# Patient Record
Sex: FEMALE | Race: WHITE | NOT HISPANIC OR LATINO | Employment: OTHER | URBAN - METROPOLITAN AREA
[De-identification: names, ages, dates, MRNs, and addresses within clinical notes are randomized per-mention and may not be internally consistent; named-entity substitution may affect disease eponyms.]

---

## 2017-01-16 ENCOUNTER — GENERIC CONVERSION - ENCOUNTER (OUTPATIENT)
Dept: OTHER | Facility: OTHER | Age: 77
End: 2017-01-16

## 2017-01-20 ENCOUNTER — GENERIC CONVERSION - ENCOUNTER (OUTPATIENT)
Dept: OTHER | Facility: OTHER | Age: 77
End: 2017-01-20

## 2017-01-27 ENCOUNTER — GENERIC CONVERSION - ENCOUNTER (OUTPATIENT)
Dept: OTHER | Facility: OTHER | Age: 77
End: 2017-01-27

## 2017-02-14 ENCOUNTER — GENERIC CONVERSION - ENCOUNTER (OUTPATIENT)
Dept: OTHER | Facility: OTHER | Age: 77
End: 2017-02-14

## 2017-02-24 ENCOUNTER — GENERIC CONVERSION - ENCOUNTER (OUTPATIENT)
Dept: OTHER | Facility: OTHER | Age: 77
End: 2017-02-24

## 2017-03-02 ENCOUNTER — ALLSCRIPTS OFFICE VISIT (OUTPATIENT)
Dept: OTHER | Facility: OTHER | Age: 77
End: 2017-03-02

## 2017-03-10 ENCOUNTER — GENERIC CONVERSION - ENCOUNTER (OUTPATIENT)
Dept: OTHER | Facility: OTHER | Age: 77
End: 2017-03-10

## 2017-06-08 ENCOUNTER — GENERIC CONVERSION - ENCOUNTER (OUTPATIENT)
Dept: OTHER | Facility: OTHER | Age: 77
End: 2017-06-08

## 2017-08-30 ENCOUNTER — GENERIC CONVERSION - ENCOUNTER (OUTPATIENT)
Dept: OTHER | Facility: OTHER | Age: 77
End: 2017-08-30

## 2017-09-07 ENCOUNTER — ALLSCRIPTS OFFICE VISIT (OUTPATIENT)
Dept: OTHER | Facility: OTHER | Age: 77
End: 2017-09-07

## 2017-09-07 DIAGNOSIS — Z13.89 ENCOUNTER FOR SCREENING FOR OTHER DISORDER: ICD-10-CM

## 2017-09-07 DIAGNOSIS — I10 ESSENTIAL (PRIMARY) HYPERTENSION: ICD-10-CM

## 2017-09-07 DIAGNOSIS — E78.5 HYPERLIPIDEMIA: ICD-10-CM

## 2017-10-17 ENCOUNTER — GENERIC CONVERSION - ENCOUNTER (OUTPATIENT)
Dept: OTHER | Facility: OTHER | Age: 77
End: 2017-10-17

## 2017-11-09 ENCOUNTER — GENERIC CONVERSION - ENCOUNTER (OUTPATIENT)
Dept: OTHER | Facility: OTHER | Age: 77
End: 2017-11-09

## 2017-11-21 ENCOUNTER — GENERIC CONVERSION - ENCOUNTER (OUTPATIENT)
Dept: OTHER | Facility: OTHER | Age: 77
End: 2017-11-21

## 2017-12-19 ENCOUNTER — GENERIC CONVERSION - ENCOUNTER (OUTPATIENT)
Dept: INTERNAL MEDICINE CLINIC | Facility: CLINIC | Age: 77
End: 2017-12-19

## 2018-01-14 VITALS
OXYGEN SATURATION: 97 % | BODY MASS INDEX: 29.83 KG/M2 | HEART RATE: 69 BPM | WEIGHT: 162.13 LBS | SYSTOLIC BLOOD PRESSURE: 130 MMHG | HEIGHT: 62 IN | DIASTOLIC BLOOD PRESSURE: 62 MMHG

## 2018-01-15 VITALS
HEIGHT: 62 IN | OXYGEN SATURATION: 98 % | SYSTOLIC BLOOD PRESSURE: 138 MMHG | HEART RATE: 73 BPM | BODY MASS INDEX: 28.71 KG/M2 | TEMPERATURE: 78.2 F | DIASTOLIC BLOOD PRESSURE: 60 MMHG | WEIGHT: 156 LBS

## 2018-01-15 NOTE — PROGRESS NOTES
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)   12  Medicare annual wellness visit, subsequent (V70 0) (Z00 00)   13  Need for pneumococcal vaccination (V03 82) (Z23)   14  Osteopenia (733 90) (M85 80)   15  Vitamin D deficiency (268 9) (E55 9)    Current Meds   1  Aspirin 81 MG TABS; TAKE 1 TABLET DAILY; Therapy: 34Bai8359 to Recorded   2  Biotin TABS; Therapy: (Recorded:02Oct2015) to Recorded   3  Centrum Silver Oral Tablet; TAKE 1 TABLET DAILY; Therapy: (450 45 295) to Recorded   4  Citracal Calcium+D TB24; Take 1 tablet twice daily; Therapy: (450 45 295) to Recorded   5  Metoprolol Tartrate 25 MG Oral Tablet; TAKE 1 TABLET TWICE DAILY; Therapy: 27TIA4460 to (Evaluate:51Gkg6329)  Requested for: 69ZRF4418; Last   Rx:07Mar2016 Ordered   6  Pepcid 20 MG Oral Tablet; oen pill once a day; Therapy: 01Sep2016 to (Last Rx:60Lws5795)  Requested for: 01Sep2016 Ordered   7  PredniSONE 20 MG Oral Tablet; one pill q day x5 day and then 1/2 po q day x 3 day   and then a 1/4 of the pillx 3 days; Therapy: 01Sep2016 to (Last Rx:57Kmr7925)  Requested for: 01Sep2016 Ordered   8  PreserVision AREDS CAPS; Take 1 tablet twice daily; Therapy: (450 45 295) to Recorded   9  Simvastatin 20 MG Oral Tablet; TAKE 1 TABLET EVERY DAY; Therapy: 29BNO3462 to (Mike Barcenas)  Requested for: 79ONF6877; Last   Rx:30Nov2015 Ordered   10   Valsartan-Hydrochlorothiazide 160-25 MG Oral Tablet; TAKE 1 TABLET DAILY; Therapy: 92CFB4258 to (Evaluate:32Jru7856)  Requested for: 37Coo9019; Last    Rx:35Xhc7074 Ordered   11  Vitamin D 1000 UNIT CAPS; TAKE AS DIRECTED; Therapy: (Recorded:02Oct2015) to Recorded    Allergies    1  Lisinopril TABS   2  Novocain SOLN    3  Latex    Plan  Flu vaccine need    · Fluzone Quadrivalent 0 5 ML Intramuscular Suspension    Future Appointments    Date/Time Provider Specialty Site   10/10/2017 08:20 AM ARNULFO Williamson  Hematology Oncology [facility]   02/06/2017 09:20 AM ARNULFO Snell   Internal Medicine [facility]     Signatures   Electronically signed by : ARNULFO Cuba ; Oct 17 2016  6:14PM EST                       (Author)

## 2018-01-16 NOTE — MISCELLANEOUS
History of Present Illness  TCM Communication [facility]: The patient is being contacted for follow-up after hospitalization and short term disability  She was hospitalized at  The date of admission: 2/14/17, date of discharge: 2/28/17  Diagnosis: cabg  She was discharged to home  She scheduled a follow up appointment  Communication performed and completed by      Active Problems    1  Acute diarrhea (787 91) (R19 7)   2  Allergy (995 3) (T78 40XA)   3  Aortic stenosis (424 1) (I35 0)   4  Benign essential hypertension (401 1) (I10)   5  Contact dermatitis due to poison ivy (692 6) (L23 7)   6  Encounter for special screening examination for genitourinary disorder (V81 6) (Z13 89)   7  Flu vaccine need (V04 81) (Z23)   8  Hearing problem of both ears (V41 2) (H91 93)   9  Hyperglycemia (790 29) (R73 9)   10  Hyperlipidemia (272 4) (E78 5)   11  Hypertension (401 9) (I10)   12  Intraductal carcinoma in situ, 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)   13  Medicare annual wellness visit, subsequent (V70 0) (Z00 00)   14  Need for pneumococcal vaccination (V03 82) (Z23)   15  Osteopenia (733 90) (M85 80)   16  Vitamin D deficiency (268 9) (E55 9)    Surgical History    1  History of Breast Surgery Lumpectomy   2  History of Tubal Ligation    Family History  Mother    1  Family history of Hypertension  Father    2  Family history of Lung cancer    Social History    · Denied: History of Alcohol Use (History)   · Denied: History of Drug Use   · Never a smoker   · Unknown If Ever Smoked    Current Meds   1  Aspirin 81 MG TABS; TAKE 1 TABLET DAILY; Therapy: 51Ojc1865 to Recorded   2  Biotin TABS; Therapy: (Recorded:64Ydh7851) to Recorded   3  Centrum Silver Oral Tablet; TAKE 1 TABLET DAILY; Therapy: (0664 313 06 34) to Recorded   4  Citracal Calcium+D TB24; Take 1 tablet twice daily; Therapy: (0664 313 06 34) to Recorded   5  Metoprolol Tartrate 25 MG Oral Tablet; TAKE 1 TABLET TWICE DAILY;    Therapy: 65CZD1468 to (Evaluate:57Qcn9548)  Requested for: 92TBI6788; Last   Rx:07Mar2016 Ordered   6  MetroNIDAZOLE 500 MG Oral Tablet; TAKE 1 TABLET TWICE DAILY UNTIL FINISHED; Therapy: 31JPS5567 to (Evaluate:13Jan2017)  Requested for: 50KSJ5486; Last   Rx:06Jan2017 Ordered   7  Pepcid 20 MG Oral Tablet; oen pill once a day; Therapy: 01Sep2016 to (Last Rx:01Sep2016)  Requested for: 21Jzl7222 Ordered   8  PreserVision AREDS CAPS; Take 1 tablet twice daily; Therapy: [phone]) to Recorded   9  Simvastatin 20 MG Oral Tablet; Take 1 tablet daily; Therapy: 84IWG5664 to (Last Rx:17Jan2017)  Requested for: 20QOV5797 Ordered   10  Valsartan-Hydrochlorothiazide 160-25 MG Oral Tablet; Take 1 tablet daily; Therapy: 41OGI0246 to (Last Rx:17Jan2017)  Requested for: 02MRL4343 Ordered   11  Vitamin D 1000 UNIT CAPS; TAKE AS DIRECTED; Therapy: (Recorded:02Oct2015) to Recorded    Allergies    1  Lisinopril TABS   2  Novocain SOLN    3  Latex    Future Appointments    Date/Time Provider Specialty Site   10/17/2017 08:20 AM ARNULFO Leyva  Hematology Oncology [facility]   03/02/2017 10:40 AM ARNULFO Del [place]   Internal Medicine [facility]     Signatures   Electronically signed by : Barnie Simmonds, M D ; Feb 24 2017  3:45PM EST                       (Author)

## 2018-01-17 ENCOUNTER — ALLSCRIPTS OFFICE VISIT (OUTPATIENT)
Dept: OTHER | Facility: OTHER | Age: 78
End: 2018-01-17

## 2018-01-18 NOTE — PROGRESS NOTES
Assessment   1  Uncontrolled hypertension (401 9) (I10)    Plan   Health Maintenance    · There are many exercise options for seniors ; Status:Complete - Retrospective    Authorization;   Done: 70ZDP9056    Discussion/Summary      Patient recently had a flare up of rosacea possibly when she used a new Neutrogena product  This led her to seek out Urgent Care where she was given prednisone and has been feeling more flushed diaphoretic and has noticed her blood pressure to be high as mentioned above  On asking it appears that the blood pressure has been creeping up to 150 even prior to the above events  Patient denies any cardiac or cerebrovascular symptoms as mentioned above  At this point I would increase her metoprolol to 25 mg 3 times a day from twice a day and I will revisit her on Monday  I would also decrease her prednisone to half a pill next couple of days on the prednisone pack to finish her course by Friday  The patient was counseled regarding instructions for management,-- risk factor reductions,-- patient and family education,-- impressions,-- importance of compliance with treatment  total time of encounter was 25 minutes minutes-- and-- More than 50% minutes was spent counseling  History of Present Illness   Hypertension (Follow-Up): The patient presents for follow-up of Elevated today  Symptoms: denies impaired vision,-- denies dyspnea,-- denies chest pain-- and-- denies lower extremity edema  Associated symptoms include no focal neurologic deficits  Home monitoring: The patient checks her blood pressure sporadically  Blood pressure control has been poor  Medications: the patient is adherent with her medication regimen  -- She denies medication side effects  Review of Systems        Cardiovascular: as noted in HPI  Respiratory: as noted in HPI  Active Problems   1  Benign essential hypertension (401 1) (I10)   2   Encounter for breast cancer screening other than mammogram (V76 10) (Z12 39)   3  Encounter for special screening examination for genitourinary disorder (V81 6) (Z13 89)   4  Flu vaccine need (V04 81) (Z23)   5  Hearing problem of both ears (V41 2) (H91 93)   6  Hyperglycemia (790 29) (R73 9)   7  Hyperlipidemia (272 4) (E78 5)   8  Medicare annual wellness visit, subsequent (V70 0) (Z00 00)   9  Need for pneumococcal vaccination (V03 82) (Z23)   10  Noninfiltrating intraductal carcinoma of left 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 0) (D05 12)   11  Osteopenia (733 90) (M85 80)   12  Vitamin D deficiency (268 9) (E55 9)    Past Medical History   Active Problems And Past Medical History Reviewed: The active problems and past medical history were reviewed and updated today  Social History    · Denied: History of Alcohol Use (History)   · Denied: History of Drug Use   · Never a smoker   · Unknown If Ever Smoked  The social history was reviewed and updated today  Family History   Family History Reviewed: The family history was reviewed and updated today  Current Meds    1  Aspirin 81 MG TABS; TAKE 1 TABLET DAILY; Therapy: 80Niv2748 to Recorded   2  Biotin TABS; Therapy: (Recorded:02Oct2015) to Recorded   3  Centrum Silver Oral Tablet; TAKE 1 TABLET DAILY; Therapy: (([phone]) to Recorded   4  Citracal Calcium+D TB24; Take 1 tablet twice daily; Therapy: (([phone]) to Recorded   5  Metoprolol Tartrate 25 MG Oral Tablet; take 1 tablet twice a day; Therapy: 08NIG9993 to (Last SE:40BHV0640)  Requested for: 48YGN6193 Ordered   6  Pepcid 20 MG Oral Tablet; oen pill once a day; Therapy: 20Yyx6173 to (Last Rx:59Drb4324)  Requested for: 85Lul1196 Ordered   7  PreserVision AREDS CAPS; Take 1 tablet twice daily; Therapy: (([phone]) to Recorded   8  Simvastatin 20 MG Oral Tablet; Take 1 tablet daily; Therapy: 56JTB5952 to (Last Rx:96Cfk8822)  Requested for: 10Rlz1501 Ordered   9   Vitamin D3 1000 UNIT Oral Capsule; TAKE 3 CAPSULE; Therapy: (Recorded:68Wdt7940) to Recorded     The medication list was reviewed and updated today  Allergies   1  Lisinopril TABS   2  Novocain SOLN  3  Latex    Vitals    Recorded: 65AJR8661 02:09PM   Heart Rate 69   Systolic 294   Diastolic 52   Height 5 ft 2 in   Weight 163 lb 6 oz   BMI Calculated 29 88   BSA Calculated 1 75   O2 Saturation 98     Physical Exam        Constitutional      General appearance: No acute distress, well appearing and well nourished  Eyes      Conjunctiva and lids: No swelling, erythema or discharge  Ears, Nose, Mouth, and Throat      Oropharynx: Normal with no erythema, edema, exudate or lesions  Pulmonary      Respiratory effort: No increased work of breathing or signs of respiratory distress  Auscultation of lungs: Clear to auscultation  Cardiovascular      Auscultation of heart: Abnormal  -- S1S2 soft mechanical       Examination of extremities for edema and/or varicosities: Normal        Abdomen      Abdomen: Non-tender, no masses  Musculoskeletal      Gait and station: Normal        Skin      Skin and subcutaneous tissue: Abnormal  -- Slight flare-up of rosacea  Future Appointments      Date/Time Provider Specialty Site   01/22/2018 02:40 PM ARNULFO Gamble  Internal Medicine [facility]   03/09/2018 09:00 AM ARNULFO Gamble   Internal Medicine [facility]     Signatures    Electronically signed by : Fernande Rinne, M D ; Jan 17 2018  3:09PM EST                       (Author)

## 2018-01-22 ENCOUNTER — ALLSCRIPTS OFFICE VISIT (OUTPATIENT)
Dept: OTHER | Facility: OTHER | Age: 78
End: 2018-01-22

## 2018-01-22 VITALS
OXYGEN SATURATION: 97 % | WEIGHT: 165 LBS | SYSTOLIC BLOOD PRESSURE: 140 MMHG | HEART RATE: 61 BPM | BODY MASS INDEX: 30.36 KG/M2 | DIASTOLIC BLOOD PRESSURE: 70 MMHG | TEMPERATURE: 96.7 F | RESPIRATION RATE: 16 BRPM | HEIGHT: 62 IN

## 2018-01-22 VITALS
OXYGEN SATURATION: 98 % | DIASTOLIC BLOOD PRESSURE: 52 MMHG | BODY MASS INDEX: 30.07 KG/M2 | HEIGHT: 62 IN | SYSTOLIC BLOOD PRESSURE: 160 MMHG | HEART RATE: 69 BPM | WEIGHT: 163.38 LBS

## 2018-01-30 NOTE — PROGRESS NOTES
Assessment   1  Medicare annual wellness visit, subsequent (V70 0) (Z00 00)    Plan  Benign essential hypertension    · Renew: Metoprolol Tartrate 25 MG Oral Tablet; take 1 tablet three times  a day    Discussion/Summary  Impression: Subsequent Annual Wellness Visit  Cardiovascular screening and counseling: due for a lipid panel  Diabetes screening and counseling: due for blood glucose  Colorectal cancer screening and counseling: screening is current and Colonoscopy about 2 years ago  Breast cancer screening and counseling: screening is current and Had mammogram this morning results pending  Cervical cancer screening and counseling: screening not indicated  Osteoporosis screening and counseling: due for DXA axial skeleton  Abdominal aortic aneurysm screening and counseling: screening not indicated  Glaucoma screening and counseling: screening is current  HIV screening and counseling: screening not indicated  Advance Directive Planning: complete and up to date  Patient Discussion: plan discussed with the patient, follow-up visit needed in 3 months  History of Present Illness  The patient is being seen for the subsequent annual wellness visit  Medicare Screening and Risk Factors1    Hospitalizations:1  she has been previously hospitalizied1   Medicare Screening Tests Risk Questions   Drug and Alcohol Use:1  The patient  has never smoked cigarettes1  1   The patient reports  never drinking alcohol1  1   She  has never used illicit drugs1  1   Diet and Physical Activity:1  Current diet includes  well balanced meals1  ,  low fat food choices1  ,  low salt food choices1  ,  2 servings of fruit per day1  ,  2 servings of vegetables per day1  ,  1 servings of meat per day1  ,  1 servings of whole grains per day1  and  1 servings of dairy products per day1  1   She  exercises daily1  1   Exercise:1  water exercises1  ,  stretching1  ,  strength training1  45 minutes per day1      Mood Disorder and Cognitive Impairment Screenin  PHQ-9 Depression Scale1 She denies feeling down, depressed, or hopeless over the past two weeks1   She denies feeling little interest or pleasure in doing things over the past two weeks1   Cognitive impairment screenin  denies difficulty learning/retaining new information1 , denies difficulty handling complex tasks1 , denies difficulty with reasoning1 , denies difficulty with spatial ability and orientation1 , denies difficulty with language1  and denies difficulty with behavior1   Functional Ability/Level of Safety:1  Hearing is1  slightly decreased1 , slightly decreased in the right ear1  and slightly decreased in the left ear1   She denies hearing difficulties1  1  She does not use a hearing aid  1   Activities of daily living details:1  does not need help using the phone1 , no transportation help needed1 , does not need help shopping1 , no meal preparation help needed1 , does not need help doing housework1 , does not need help doing laundry1 , does not need help managing medications1  and does not need help managing money1   Fall risk factors: 1  The patient fell 0 times in the past 12 months  1   Injury History:1  no urinary incontinence1   Home safety risk factors: 1  no unfamiliar surroundings1 , no loose rugs1 , no poor household lighting1 , no uneven floors1 , no household clutter1 , grab bars in the bathroom1  and handrails on the stairs1   Advance Directives:1  Advance directives: 1  living will1 , durable power of  for health care directives1  and advance directives1   Co-Managers and Medical Equipment/Suppliers: See Patient Care Team   Falls Risk:1  The patient fell 0 times in the past 12 months  1      The patient currently has no urinary incontinence symptoms1          1 Amended By: Joni Snider; 2018 3:47 PM EST    Patient Care Team    Care Team Member Role Specialty Office Number   Alina Montez MD  Surgical Oncology [phone] Marco A Calzada MD  Radiation Oncology [phone]  Obstetrics/Gynecology [phone]   [facility]  Hematology Oncology [phone]   Elisa THOMPSON  Internal Medicine [phone]     Review of Systems    Constitutional: no fever, no chills, no malaise and no fatigue  Head and Face: no facial pressure  Eyes: no blurred vision  ENT: no earache, no nasal congestion and no sore throat    The patient presents with complaints of gradual onset of mild hearing loss  Cardiovascular: no chest pain, no palpitations, no lightheadedness and no lower extremity edema  Respiratory: no shortness of breath and no wheezing  Gastrointestinal: no abdominal pain, no nausea, no vomiting and no melena  Genitourinary: No further urinary complaints since recent lithotripsy in November  She had, but no dysuria, no urinary frequency, no urinary urgency and no hematuria  Integumentary and Breasts: facial rosacea has settled, but no rashes  Neurological: no headache, no confusion and no dizziness  Psychiatric: much better since this been off of prednisone, but no anxiety and no depression  Endocrine: no muscle weakness  Hematologic and Lymphatic: no tendency for easy bruising  Active Problems   1  Benign essential hypertension (401 1) (I10)  2  Encounter for breast cancer screening other than mammogram (V76 10) (Z12 39)  3  Encounter for special screening examination for genitourinary disorder (V81 6) (Z13 89)  4  Flu vaccine need (V04 81) (Z23)  5  Hearing problem of both ears (V41 2) (H91 93)  6  Hyperglycemia (790 29) (R73 9)  7  Hyperlipidemia (272 4) (E78 5)  8  Medicare annual wellness visit, subsequent (V70 0) (Z00 00)  9  Need for pneumococcal vaccination (V03 82) (Z23)  10  Noninfiltrating intraductal carcinoma of left breast (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 0) (D05 12)  11  Osteopenia (733 90) (M85 80)  12  Vitamin D deficiency (268 9) (E55 9)    Past Medical History   1   History of Acute diarrhea (787 91) (R19 7)  2  History of Contact dermatitis due to poison ivy (692 6) (L23 7)  3  History of Dyspepsia (536 8) (R10 13)  4  History of allergy (V15 09) (Z88 9)  5  History of aortic valve stenosis (V12 59) (Z86 79)  6  History of cough    The active problems and past medical history were reviewed and updated today  Surgical History   1  History of Aortic Valve Replacement  2  History of Breast Surgery Lumpectomy  3  History of CABG  4  History of Tubal Ligation    The surgical history was reviewed and updated today  Family History  Mother   1  Family history of Hypertension  Father   2  Family history of Lung cancer    The family history was reviewed and updated today  Social History    · Denied: History of Alcohol Use (History)   · Denied: History of Drug Use   · Never a smoker   · Unknown If Ever Smoked  The social history was reviewed and updated today  Current Meds  1  Aspirin 81 MG TABS; TAKE 1 TABLET DAILY; Therapy: 34Hid2397 to Recorded  2  Biotin TABS; Therapy: (Recorded:02Oct2015) to Recorded  3  Centrum Silver Oral Tablet; TAKE 1 TABLET DAILY; Therapy: (([phone]) to Recorded  4  Citracal Calcium+D TB24; Take 1 tablet twice daily; Therapy: (([phone] 6793 4834) to Recorded  5  Metoprolol Tartrate 25 MG Oral Tablet; take 1 tablet twice a day; Therapy: 90MPE8131 to (Last MQ:75MJJ1944)  Requested for: 82OIP1548 Ordered  6  Pepcid 20 MG Oral Tablet; oen pill once a day; Therapy: 83Vbc1689 to (Last Rx:11Lyq6828)  Requested for: 95Flc3259 Ordered  7  PreserVision AREDS CAPS; Take 1 tablet twice daily; Therapy: (([phone] [phone]) to Recorded  8  Simvastatin 20 MG Oral Tablet; Take 1 tablet daily; Therapy: 45YKC6669 to (Last Rx:24Lny9272)  Requested for: 78Doz2382 Ordered  9  Vitamin D3 1000 UNIT Oral Capsule; TAKE 3 CAPSULE; Therapy: (Recorded:71Gcs1333) to Recorded    The medication list was reviewed and updated today  Allergies   1  Lisinopril TABS  2  Novocain SOLN   3  Latex    Immunizations  Influenza --- Kaur Boas: 04-Oct-2000; Series2: 12-Nov-2001; Series3: 02-Oct-2002; Series4:  15-Oct-2003; Series5: 26-Oct-2004; Series6: Oct 2007; Series7: 17-Nxb-7610Iktfzoo Buerger: 2009;  Series9: 29-Sep-2011Baker Solid: 03-Oct-2014; PKQUIO24: 01-Oct-2015; NQWHNJ85: 14-Oct-2016;  TJXHWC05: Nov 2017   PCV --- Series1: 16-Jul-2015   PPSV --- Series1: 17-Jan-2003; Series2: 28-Oct-2010   Tdap --- Kaur Boas: 06-Nov-2013   Zoster --- Series1: 2010     Vitals  Signs   Recorded: 20SGW1425 02:41PM   Heart Rate: 64  Systolic: 233  Diastolic: 70  Height: 5 ft 2 in  Weight: 165 lb   BMI Calculated: 30 18  BSA Calculated: 1 76  O2 Saturation: 98    Future Appointments    Date/Time Provider Specialty Site   03/09/2018 09:00 AM Leopoldo Locket, M D   Internal Medicine [facility] IM     Signatures   Electronically signed by : ARNULFO Bright ; Jan 22 2018  3:13PM EST                       (Author)    Electronically signed by : ARNULFO Bright ; Jan 22 2018  3:48PM EST                       (Author)

## 2018-02-06 NOTE — PROGRESS NOTES
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)    Plan   Intraductal carcinoma in situ, left    · (1) CBC/PLT/DIFF; Status:Active; Requested for:11Oct2016;    Perform:[facility]; Due:11Oct2017;Ordered; For:Intraductal carcinoma in situ, left; Ordered By:Anastasiia Andrews;   · (1) COMPREHENSIVE METABOLIC PANEL; Status:Active; Requested for:11Oct2016;    Perform:[facility]; Due:11Oct2017;Ordered; For:Intraductal carcinoma in situ, left; Ordered By:Guillermo Andrews;   · Follow-up visit in 1 year Evaluation and Treatment  Follow-up  Status: Complete  Done:    [id number] 08:20AM   Ordered; For: Intraductal carcinoma in situ, left; Ordered By: Sanjuana Gauthier Performed:  Due: 50KMJ3575; Last Updated By: Hiram Asencio; 10/11/2016 8:54:38 AM    Discussion/Summary   Discussion Summary:    Reginaldo Gann completed 5 years of tamoxifen for breast cancer risk reduction in April 2015  Bilateral diagnostic mammogram completed January 2016  She has prescription from Dr Janki Winn for 2017  She continues to follow up with her gynecologist, Dr Evans Lakhani  She reports she is up to date with colonoscopy (2014)  She is feeling well  She is is asked to follow up in 1 year  Call if any issues or concern  History of Present Illness   Previous Therapy:    1  Ductal carcinoma in situ of the left breast, nuclear grade I, 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%, NV 80%, status post left lumpectomy in March 2010 and partial breast irradiation with IMRT in April 2010  Tamoxifen initiated in April 2010  Completed 5 years of treatment  Osteopenia of the femoral neck  Was on Fosamax previously  Takes Calcium + vitamin D  Had Dexa 8/22/16  Current Therapy: Tamoxifen initiated in April 2010  Interval History: No new issues or complaints  Had follow up echo for her A S  Was told she had worsening  Will see her cardiologist, Dr Michelle Saldaña in December   Continues to see her gynecologist, Dr Siena Carrasco Lennox Bence  Review of Systems   Complete-Female:      Constitutional: No fever, no chills, feels well, no tiredness, no recent weight gain or weight loss  Eyes: No complaints of eye pain, no red eyes, no eyesight problems, no discharge, no dry eyes, no itching of eyes  ENT: no complaints of earache, no loss of hearing, no nose bleeds, no nasal discharge, no sore throat, no hoarseness  Cardiovascular: No complaints of slow heart rate, no fast heart rate, no chest pain, no palpitations, no leg claudication, no lower extremity edema  Respiratory: No complaints of shortness of breath, no wheezing, no cough, no SOB on exertion, no orthopnea, no PND,-- no shortness of breath-- and-- no cough  Gastrointestinal: No complaints of abdominal pain, no constipation, no nausea or vomiting, no diarrhea, no bloody stools,-- no abdominal pain,-- no constipation-- and-- no diarrhea  Genitourinary: No complaints of dysuria, no incontinence, no pelvic pain, no dysmenorrhea, no vaginal discharge or bleeding  Musculoskeletal: No complaints of arthralgias, no myalgias, no joint swelling or stiffness, no limb pain or swelling-- and-- no arthralgias  Integumentary: No complaints of skin rash or lesions, no itching, no skin wounds, no breast pain or lump  Neurological: No complaints of headache, no confusion, no convulsions, no numbness, no dizziness or fainting, no tingling, no limb weakness, no difficulty walking,-- no headache-- and-- no difficulty walking  Psychiatric: Not suicidal, no sleep disturbance, no anxiety or depression, no change in personality, no emotional problems-- and-- no emotional problems  Endocrine: No complaints of proptosis, no hot flashes, no muscle weakness, no deepening of the voice, no feelings of weakness  Hematologic/Lymphatic: No complaints of swollen glands, no swollen glands in the neck, does not bleed easily, does not bruise easily  Active Problems   1  Allergy (995 3) (T78 40XA)   2  Aortic stenosis (424 1) (I35 0)   3  Benign essential hypertension (401 1) (I10)   4  Contact dermatitis due to poison ivy (692 6) (L23 7)   5  Encounter for special screening examination for genitourinary disorder (V81 6) (Z13 89)   6  Flu vaccine need (V04 81) (Z23)   7  Hearing problem of both ears (V41 2) (H91 93)   8  Hyperglycemia (790 29) (R73 9)   9  Hyperlipidemia (272 4) (E78 5)   10  Hypertension (401 9) (I10)   11  Intraductal carcinoma in situ, 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)   12  Medicare annual wellness visit, subsequent (V70 0) (Z00 00)   13  Need for pneumococcal vaccination (V03 82) (Z23)   14  Osteopenia (733 90) (M85 80)   15  Vitamin D deficiency (268 9) (E55 9)    Surgical History   1  History of Breast Surgery Lumpectomy   2  History of Tubal Ligation    Family History   Mother    1  Family history of Hypertension  Father    2  Family history of Lung cancer    Social History    · Denied: History of Alcohol Use (History)   · Denied: History of Drug Use   · Never a smoker   · Unknown If Ever Smoked    Current Meds    1  Aspirin 81 MG TABS; TAKE 1 TABLET DAILY; Therapy: 10Ejg4986 to Recorded   2  Biotin TABS; Therapy: (Recorded:02Oct2015) to Recorded   3  Centrum Silver Oral Tablet; TAKE 1 TABLET DAILY; Therapy: (0664 313 06 34) to Recorded   4  Citracal Calcium+D TB24; Take 1 tablet twice daily; Therapy: (0664 313 06 34) to Recorded   5  Metoprolol Tartrate 25 MG Oral Tablet; TAKE 1 TABLET TWICE DAILY; Therapy: 52URD5855 to (Evaluate:96Fpr4442)  Requested for: 39RAG6920; Last     Rx:07Mar2016 Ordered   6  Pepcid 20 MG Oral Tablet; oen pill once a day; Therapy: 01Sep2016 to (Last Rx:01Sep2016)  Requested for: 01Sep2016 Ordered   7  PredniSONE 20 MG Oral Tablet; one pill q day x5 day and then 1/2 po q day x 3 day     and then a 1/4 of the pillx 3 days;      Therapy: 01Sep2016 to (Last Rx:05Mam4373)  Requested for: 66TBA1722 Ordered   8  PreserVision AREDS CAPS; Take 1 tablet twice daily; Therapy: ([id number]) to Recorded   9  Simvastatin 20 MG Oral Tablet; TAKE 1 TABLET EVERY DAY; Therapy: 64ABY9813 to (Divina Bland)  Requested for: 35XTN6479; Last     Rx:30Nov2015 Ordered   10  Valsartan-Hydrochlorothiazide 160-25 MG Oral Tablet; TAKE 1 TABLET DAILY; Therapy: 70AIA2566 to (Evaluate:49Fmk0912)  Requested for: 07Pfm1803; Last      Rx:21Mss6533 Ordered   11  Vitamin D 1000 UNIT CAPS; TAKE AS DIRECTED; Therapy: (Recorded:02Oct2015) to Recorded    Allergies   1  Lisinopril TABS   2  Novocain SOLN  3  Latex    Vitals   Vital Signs    Recorded: 35GGR4120 38:75BT   Systolic 318   Diastolic 70   Heart Rate 70   Respiration 16   Temperature 98 F   O2 Saturation 96   Height 5 ft 2 in   Weight 170 lb 4 00 oz   BMI Calculated 31 14   BSA Calculated 1 78     Physical Exam        Constitutional      General appearance: No acute distress, well appearing and well nourished  Eyes      Conjunctiva and lids: No swelling, erythema or discharge  Pupils and irises: Equal, round and reactive to light  Ears, Nose, Mouth, and Throat      External inspection of ears and nose: Normal  -- heartin id -- Oropharynx is clear  Pulmonary      Auscultation of lungs: Clear to auscultation  Cardiovascular      Palpation of heart: Normal PMI, no thrills  -- + murmur  Examination of extremities for edema and/or varicosities: Normal        Abdomen      Abdomen: Non-tender, no masses  Liver and spleen: No hepatomegaly or splenomegaly  Lymphatic      Palpation of lymph nodes in neck: No lymphadenopathy  Musculoskeletal      Gait and station: Normal  -- arthritic changes  Skin      Skin and subcutaneous tissue: Normal without rashes or lesions  Neurologic Neurologicals grossly intact        Psychiatric      Orientation to person, place, and time: Normal        Mood and affect: Normal        Additional Exam:  no breast tenderness or palpable mass  ECOG 0       Future Appointments      Date/Time Provider Specialty Site   02/06/2017 09:20 AM ARNULFO Segovia   Internal Medicine [facility]   10/14/2016 12:30 PM CAMILA Hewitt, Nurse Schedule  [facility]     Signatures    Electronically signed by : Eduardo Sarmiento [facility]; Oct 11 2016  9:00AM EST                       (Author)     Electronically signed by : ARNULFO Kidd ; Feb 5 2018 10:32PM EST                       (Author)

## 2018-02-13 ENCOUNTER — OFFICE VISIT (OUTPATIENT)
Dept: INTERNAL MEDICINE CLINIC | Facility: CLINIC | Age: 78
End: 2018-02-13
Payer: MEDICARE

## 2018-02-13 ENCOUNTER — TELEPHONE (OUTPATIENT)
Dept: INTERNAL MEDICINE CLINIC | Facility: CLINIC | Age: 78
End: 2018-02-13

## 2018-02-13 VITALS
SYSTOLIC BLOOD PRESSURE: 140 MMHG | BODY MASS INDEX: 30.55 KG/M2 | OXYGEN SATURATION: 97 % | DIASTOLIC BLOOD PRESSURE: 62 MMHG | HEART RATE: 68 BPM | HEIGHT: 62 IN | WEIGHT: 166 LBS

## 2018-02-13 DIAGNOSIS — Z95.2 AORTIC VALVE REPLACED: ICD-10-CM

## 2018-02-13 DIAGNOSIS — I10 HYPERTENSION, UNSPECIFIED TYPE: Primary | ICD-10-CM

## 2018-02-13 PROCEDURE — 99214 OFFICE O/P EST MOD 30 MIN: CPT | Performed by: INTERNAL MEDICINE

## 2018-02-13 RX ORDER — SIMVASTATIN 20 MG
TABLET ORAL
COMMUNITY
Start: 2017-12-29 | End: 2018-10-01 | Stop reason: SDUPTHER

## 2018-02-13 RX ORDER — FAMOTIDINE 20 MG/1
20 TABLET, FILM COATED ORAL
COMMUNITY
Start: 2017-10-02 | End: 2018-03-23 | Stop reason: SDUPTHER

## 2018-02-13 RX ORDER — FAMOTIDINE 20 MG/1
TABLET, FILM COATED ORAL
COMMUNITY
Start: 2016-09-01 | End: 2018-03-09 | Stop reason: SDUPTHER

## 2018-02-13 RX ORDER — VIT A/VIT C/VIT E/ZINC/COPPER 4296-226
1 CAPSULE ORAL 2 TIMES DAILY
COMMUNITY

## 2018-02-13 RX ORDER — HYDROCHLOROTHIAZIDE 25 MG/1
25 TABLET ORAL DAILY
Qty: 30 TABLET | Refills: 1 | Status: SHIPPED | OUTPATIENT
Start: 2018-02-13 | End: 2018-03-09 | Stop reason: ALTCHOICE

## 2018-02-13 RX ORDER — BIOTIN 10 MG
TABLET ORAL
COMMUNITY

## 2018-02-13 RX ORDER — TRIAMCINOLONE ACETONIDE 1 MG/G
CREAM TOPICAL
COMMUNITY
Start: 2018-01-10 | End: 2019-02-20 | Stop reason: ALTCHOICE

## 2018-02-13 RX ORDER — METRONIDAZOLE 10 MG/G
GEL TOPICAL
COMMUNITY
Start: 2016-06-09

## 2018-02-13 RX ORDER — MULTIVIT-MIN/IRON/FOLIC ACID/K 18-600-40
3000 CAPSULE ORAL
COMMUNITY

## 2018-02-13 RX ORDER — AMOXICILLIN 500 MG/1
TABLET, FILM COATED ORAL
COMMUNITY
Start: 2017-07-19

## 2018-02-13 RX ORDER — ASPIRIN 81 MG/1
81 TABLET, CHEWABLE ORAL
COMMUNITY
Start: 2011-08-24

## 2018-02-13 NOTE — TELEPHONE ENCOUNTER
Pt called stating that she takes metoprolol now 3 times a day and this change was made a few weeks ago however she isnt noticing much of a difference in her blood pressure and wants to know what she should do?  Please advise

## 2018-02-13 NOTE — PROGRESS NOTES
Chief Complaint   Patient presents with    Blood Pressure Check     Assessment/Plan:    No problem-specific Assessment & Plan notes found for this encounter  Diagnoses and all orders for this visit:    Hypertension, unspecified type  -     hydrochlorothiazide (HYDRODIURIL) 25 mg tablet; Take 1 tablet (25 mg total) by mouth daily    I will start  Her back on HCTZ  II asked her to come back in a week to get blood pressure check with nurse  If the blood pressure is normal I will see her back early next month  Aortic valve replaced    Other orders  -     amoxicillin (AMOXIL) 500 MG tablet; Take 4 tablets 30-60 minutes prior to dental procedures  -     aspirin 81 mg chewable tablet; Chew 81 mg  -     Cholecalciferol (VITAMIN D) 2000 units CAPS; Take 3,000 Units by mouth  -     Biotin 10 MG TABS; Take by mouth  -     Multiple Vitamins-Minerals (CENTRUM ADULTS PO); Take 1 tablet by mouth daily  -     famotidine (PEPCID) 20 mg tablet; Take 20 mg by mouth  -     metoprolol tartrate (LOPRESSOR) 25 mg tablet; Take 25 mg by mouth 2 pills in AM 1 pill PM    -     metroNIDAZOLE (METROGEL) 1 % gel;   -     famotidine (PEPCID) 20 mg tablet; Take by mouth  -     Multiple Vitamins-Minerals (PRESERVISION AREDS) CAPS; Take 1 tablet by mouth 2 (two) times a day  -     simvastatin (ZOCOR) 20 mg tablet;   -     triamcinolone (KENALOG) 0 1 % cream;         Subjective:      Patient ID: Felipe Halsted is a 76 y o  female  Patient is here to follow-up on hypertension  She is status post aortic valve replacement  After her surgery only medication that she needed had been metoprolol 25 mg twice a day  Recently her blood pressure had been on the higher side and increase her metoprolol to 50 mg in the morning keeping the evening dose at 25 mg  She has been monitoring her blood pressure at home  It has been quite labile symptoms going up to 160  Occasion she will feel her hands become somewhat puffy    I checked her blood pressure twice in the office today and was 138/90  The patient that she brought with her did not tell E well with our machine  The following portions of the patient's history were reviewed and updated as appropriate: allergies, current medications, past family history, past medical history, past social history, past surgical history and problem list     Review of Systems   Respiratory: Negative for cough and shortness of breath  Cardiovascular: Negative for chest pain, palpitations and leg swelling  Musculoskeletal:        Hands feel puffy sometime   Neurological: Positive for headaches  Objective:    Vitals:    02/13/18 1458   BP: 140/62   Pulse: 68   SpO2: 97%        Physical Exam   Constitutional: She is oriented to person, place, and time  She appears well-nourished  No distress  Eyes: No scleral icterus  Cardiovascular: Normal rate, regular rhythm and normal heart sounds  Pulmonary/Chest: Effort normal and breath sounds normal  No respiratory distress  She has no wheezes  She has no rales  Abdominal: Bowel sounds are normal  She exhibits no distension  There is no tenderness  Musculoskeletal: She exhibits no edema  Neurological: She is oriented to person, place, and time

## 2018-03-09 ENCOUNTER — OFFICE VISIT (OUTPATIENT)
Dept: INTERNAL MEDICINE CLINIC | Facility: CLINIC | Age: 78
End: 2018-03-09
Payer: MEDICARE

## 2018-03-09 VITALS
HEIGHT: 62 IN | OXYGEN SATURATION: 96 % | WEIGHT: 164.6 LBS | SYSTOLIC BLOOD PRESSURE: 150 MMHG | BODY MASS INDEX: 30.29 KG/M2 | DIASTOLIC BLOOD PRESSURE: 60 MMHG | HEART RATE: 64 BPM

## 2018-03-09 DIAGNOSIS — I10 ESSENTIAL HYPERTENSION: Primary | ICD-10-CM

## 2018-03-09 DIAGNOSIS — Z00.00 MEDICARE ANNUAL WELLNESS VISIT, SUBSEQUENT: ICD-10-CM

## 2018-03-09 PROCEDURE — G0438 PPPS, INITIAL VISIT: HCPCS | Performed by: INTERNAL MEDICINE

## 2018-03-09 RX ORDER — LOSARTAN POTASSIUM 50 MG/1
50 TABLET ORAL DAILY
Qty: 30 TABLET | Refills: 1 | Status: SHIPPED | OUTPATIENT
Start: 2018-03-09 | End: 2018-03-23 | Stop reason: SDUPTHER

## 2018-03-09 NOTE — PROGRESS NOTES
Assessment/Plan:    No problem-specific Assessment & Plan notes found for this encounter  Diagnoses and all orders for this visit:    Essential hypertension  -     losartan (COZAAR) 50 mg tablet; Take 1 tablet (50 mg total) by mouth daily      I would start her on on losartan 50 mg a day  She will get her blood pressure check next couple of days at  [address]  I will be seeing her back in 2 weeks for re-evaluation of her blood pressure  Medicare annual wellness visit, subsequent     Screening reviewed as above  Labs  vaccination and mammogram up-to-date  Subjective:      Patient ID: Jose Jeffers is a 76 y o  female    AWV Clinical     ISAR:   Previous hospitalizations?:  Yes   How many hospitalizations have you had in the last year?:  1-2       Once in a Lifetime Medicare Screening:       Medicare Screening Tests and Risk Assessment:   AAA Risk Assessment    Osteoporosis Risk Assessment    HIV Risk Assessment        Drug and Alcohol Use:   Tobacco use    Cigarettes:  never smoker    Tobacco use duration    Tobacco Cessation Readiness    Alcohol use    Alcohol use:  never    Alcohol Treatment Readiness   Illicit Drug Use    Drug use:  never        Diet & Exercise:   Diet   What is your diet?:  No Added Salt, Regular   How many servings a day of the following:    Meat:  1-2   Whole Grains:  1    Dairy:  1 Soda:  0   Coffee:  0 Tea:  0   Exercise    Do you currently exercise?:  yes    Frequency:  daily    Minutes per day:  45    Minutes per week:  90    Type of exercise:  walking, weights   water        Cognitive Impairment Screening:   Depression screening preformed:  No    Cognitive Impairment Screening    Do you have difficulty learning or retaining new information?:  No Do you have difficulty handling new tasks?:  No   Do you have difficulty with reasoning?:  No Do you have difficulty with spatial ability and orientation?:  No   Do you have difficulty with language?:  No Do you have difficulty with behavior?:  No       Functional Ability/Level of Safety:   Hearing     Bilateral:  slightly decreased   Left:  slightly decreased Right:  slightly decreased   Hearing aid:  Yes    Hearing Impairment Assessment    Hearing status:  Hard of hearing   Current Activities    Help needed with the folllowing:    Using the phone:  No Transportation:  No   Shopping:  No Preparing Meals:  No   Doing Housework:  No Doing Laundry:  No   Managing Medications:  No Managing Money:  No   ADL    Fall Risk   Have you fallen in the last 12 months?:  No    How many times?:  0    Injury History       Home Safety:   Are there hazards in your environment?:  No   If you fell, would you need help to get back up from the ground?:  No Do you have problems or concerns getting in/out of a bed, chair, tub, or toilet?:  No   Do you feel unsteady when walking?:  No Is your activity limited by pain?:  No   Do you have handrails and grab-bars in the home?:  Yes    Are you and/or family members aware of the dangers of smoking in bed?:  No    Do you have working smoke alarms and fire extinguisher?:  Yes    Have you left the stove on unsupervised?:  No    Home Safety Risk Factors   Unfamilar with surroundings:  No Uneven floors:  No   Stairs or handrail saftey risk:  No Loose rugs:  No   Household clutter:  No Poor household lighting:  No   No grab bars in bathroom:  Yes Further evaluation needed:  No       Advanced Directives:   Advanced Directives    Living Will:  Yes Durable POA for healthcare: Yes   Advanced directive:  Yes    Patient's End of Life Decisions        Urinary Incontinence:   Do you have urinary incontinence?:  No    Do you urinate frequently?:  No    Do you have nocturia (waking up to urinate)?:  Yes        Glaucoma:           Medicare wellness  She is [facility] at [facility]   Recently had a PPD that was unremarkable          The following portions of the patient's history were reviewed and updated as appropriate: allergies, current medications, past family history, past medical history, past social history, past surgical history and problem list     Review of Systems   Constitutional: Negative for appetite change, chills, fatigue, fever and unexpected weight change  HENT: Negative for congestion, hearing loss, postnasal drip, trouble swallowing and voice change  Eyes: Negative for pain and visual disturbance  Respiratory: Negative for cough, chest tightness and shortness of breath  Cardiovascular: Negative for chest pain, palpitations and leg swelling  Gastrointestinal: Negative for abdominal pain, blood in stool, constipation, diarrhea, nausea and vomiting  Endocrine: Negative for cold intolerance, heat intolerance, polydipsia and polyphagia  Genitourinary: Negative for difficulty urinating, dysuria, flank pain, frequency and hematuria  No recurrence of kidney stones   Musculoskeletal: Negative for arthralgias, back pain, gait problem, joint swelling and myalgias  Skin: Negative for rash  Neurological: Negative for dizziness, weakness, light-headedness, numbness and headaches  Hematological: Negative for adenopathy  Does not bruise/bleed easily  Psychiatric/Behavioral: Negative for confusion, dysphoric mood and sleep disturbance  Objective:      /60   Pulse 64   Ht 5' 2 4" (1 585 m)   Wt 74 7 kg (164 lb 9 6 oz)   SpO2 96%   BMI 29 72 kg/m²          Physical Exam   Constitutional: She is oriented to person, place, and time  She appears well-developed and well-nourished  No distress  HENT:   Head: Normocephalic  Mouth/Throat: No oropharyngeal exudate  Eyes: Conjunctivae are normal  Pupils are equal, round, and reactive to light  No scleral icterus  Neck: No JVD present  No thyromegaly present  Cardiovascular: Normal rate, regular rhythm and intact distal pulses  Murmur heard    Mechanical S2   Pulmonary/Chest: Effort normal and breath sounds normal  No respiratory distress  She has no wheezes  She has no rales  Abdominal: Soft  Bowel sounds are normal  There is no tenderness  There is no rebound  Musculoskeletal: She exhibits no edema  Lymphadenopathy:     She has no cervical adenopathy  Neurological: She is alert and oriented to person, place, and time  She has normal reflexes  No cranial nerve deficit  Skin: Skin is warm  Psychiatric: She has a normal mood and affect   Her behavior is normal  Judgment and thought content normal

## 2018-03-23 ENCOUNTER — OFFICE VISIT (OUTPATIENT)
Dept: INTERNAL MEDICINE CLINIC | Facility: CLINIC | Age: 78
End: 2018-03-23
Payer: MEDICARE

## 2018-03-23 VITALS
OXYGEN SATURATION: 96 % | HEART RATE: 60 BPM | HEIGHT: 62 IN | BODY MASS INDEX: 30.36 KG/M2 | WEIGHT: 165 LBS | DIASTOLIC BLOOD PRESSURE: 78 MMHG | SYSTOLIC BLOOD PRESSURE: 138 MMHG

## 2018-03-23 DIAGNOSIS — Z95.2 S/P AVR (AORTIC VALVE REPLACEMENT): Primary | ICD-10-CM

## 2018-03-23 DIAGNOSIS — I10 ESSENTIAL HYPERTENSION: ICD-10-CM

## 2018-03-23 PROBLEM — I51.89 DIASTOLIC DYSFUNCTION: Status: ACTIVE | Noted: 2017-11-09

## 2018-03-23 PROBLEM — Z95.1 S/P CABG X 1: Status: ACTIVE | Noted: 2017-02-08

## 2018-03-23 PROBLEM — R10.13 DYSPEPSIA: Status: ACTIVE | Noted: 2017-09-07

## 2018-03-23 PROBLEM — I25.118 CORONARY ARTERY DISEASE OF NATIVE ARTERY WITH STABLE ANGINA PECTORIS (HCC): Status: ACTIVE | Noted: 2017-01-27

## 2018-03-23 PROCEDURE — 99213 OFFICE O/P EST LOW 20 MIN: CPT | Performed by: INTERNAL MEDICINE

## 2018-03-23 RX ORDER — LOSARTAN POTASSIUM 50 MG/1
50 TABLET ORAL DAILY
Qty: 90 TABLET | Refills: 3 | Status: SHIPPED | OUTPATIENT
Start: 2018-03-23 | End: 2018-07-24 | Stop reason: SDUPTHER

## 2018-03-23 RX ORDER — FAMOTIDINE 20 MG/1
20 TABLET, FILM COATED ORAL DAILY
Qty: 30 TABLET | Refills: 3 | Status: SHIPPED | OUTPATIENT
Start: 2018-03-23

## 2018-03-23 NOTE — PROGRESS NOTES
Assessment/Plan:    No problem-specific Assessment & Plan notes found for this encounter  Diagnoses and all orders for this visit:    S/P AVR (aortic valve replacement)    Essential hypertension  -     losartan (COZAAR) 50 mg tablet; Take 1 tablet (50 mg total) by mouth daily  -     famotidine (PEPCID) 20 mg tablet; Take 1 tablet (20 mg total) by mouth daily      Continue current regimen  Follow-up in 4 months    Subjective:      Patient ID: Ambika Coy is a 76 y o  female  Patient is here to follow-up on hypertension  Her blood pressure was quite high last time she was here and started on losartan  Her blood pressure is excellent today at 138/78  She reports no chest pain shortness of breath lightheadedness or palpitation  She is taking metoprolol 25 mg twice a day as well  No new problems or concerns  Her recent lab studies were reviewed and unremarkable  A copy was given to the patient          Current Outpatient Prescriptions:     amoxicillin (AMOXIL) 500 MG tablet, Take 4 tablets 30-60 minutes prior to dental procedures, Disp: , Rfl:     aspirin 81 mg chewable tablet, Chew 81 mg, Disp: , Rfl:     Biotin 10 MG TABS, Take by mouth, Disp: , Rfl:     Cholecalciferol (VITAMIN D) 2000 units CAPS, Take 3,000 Units by mouth, Disp: , Rfl:     famotidine (PEPCID) 20 mg tablet, Take 1 tablet (20 mg total) by mouth daily, Disp: 30 tablet, Rfl: 3    losartan (COZAAR) 50 mg tablet, Take 1 tablet (50 mg total) by mouth daily, Disp: 90 tablet, Rfl: 3    metoprolol tartrate (LOPRESSOR) 25 mg tablet, Take 25 mg by mouth 2 pills in AM 1 pill PM  , Disp: , Rfl:     metroNIDAZOLE (METROGEL) 1 % gel, , Disp: , Rfl:     Multiple Vitamins-Minerals (CENTRUM ADULTS PO), Take 1 tablet by mouth daily, Disp: , Rfl:     Multiple Vitamins-Minerals (PRESERVISION AREDS) CAPS, Take 1 tablet by mouth 2 (two) times a day, Disp: , Rfl:     simvastatin (ZOCOR) 20 mg tablet, , Disp: , Rfl:     triamcinolone (KENALOG) 0 1 % cream, , Disp: , Rfl:   The following portions of the patient's history were reviewed and updated as appropriate: allergies, current medications, past family history, past medical history, past social history, past surgical history and problem list     Review of Systems   Respiratory: Negative for cough and shortness of breath  Cardiovascular: Negative for chest pain and leg swelling  Gastrointestinal: Negative for abdominal pain  Neurological: Negative for dizziness, numbness and headaches  Objective:      /78 (BP Location: Left arm, Patient Position: Sitting, Cuff Size: Standard)   Pulse 60   Ht 5' 2 4" (1 585 m)   Wt 74 8 kg (165 lb)   SpO2 96%   BMI 29 79 kg/m²          Physical Exam   Constitutional: She appears well-nourished  No distress  Cardiovascular: Normal rate and regular rhythm  Murmur heard  Pulmonary/Chest: Breath sounds normal  No respiratory distress  She has no wheezes  She has no rales  Musculoskeletal: She exhibits no edema  Neurological: She is alert

## 2018-07-24 ENCOUNTER — OFFICE VISIT (OUTPATIENT)
Dept: INTERNAL MEDICINE CLINIC | Facility: CLINIC | Age: 78
End: 2018-07-24
Payer: MEDICARE

## 2018-07-24 VITALS
OXYGEN SATURATION: 95 % | HEIGHT: 62 IN | HEART RATE: 54 BPM | TEMPERATURE: 98.5 F | SYSTOLIC BLOOD PRESSURE: 160 MMHG | WEIGHT: 167.4 LBS | DIASTOLIC BLOOD PRESSURE: 70 MMHG | BODY MASS INDEX: 30.8 KG/M2

## 2018-07-24 DIAGNOSIS — Z95.2 S/P AVR (AORTIC VALVE REPLACEMENT): ICD-10-CM

## 2018-07-24 DIAGNOSIS — I10 ESSENTIAL HYPERTENSION: Primary | ICD-10-CM

## 2018-07-24 DIAGNOSIS — E78.2 MIXED HYPERLIPIDEMIA: ICD-10-CM

## 2018-07-24 PROCEDURE — 99214 OFFICE O/P EST MOD 30 MIN: CPT | Performed by: INTERNAL MEDICINE

## 2018-07-24 RX ORDER — LOSARTAN POTASSIUM 50 MG/1
50 TABLET ORAL DAILY
Qty: 90 TABLET | Refills: 0 | Status: CANCELLED | OUTPATIENT
Start: 2018-07-24

## 2018-07-24 RX ORDER — LOSARTAN POTASSIUM 50 MG/1
TABLET ORAL
Qty: 90 TABLET | Refills: 3
Start: 2018-07-24 | End: 2018-08-24 | Stop reason: SDUPTHER

## 2018-07-24 NOTE — PROGRESS NOTES
Assessment/Plan:         Diagnoses and all orders for this visit:    Essential hypertension  -     losartan (COZAAR) 50 mg tablet; 1 and half pill in the morning    See discussion above continue with metoprolol  Follow-up blood pressure in 2 weeks with nurse  Mixed hyperlipidemia    Continue with statin  S/P AVR (aortic valve replacement)    Patient has appointment to see cardiology end of the year  Other orders  -     Cancel: losartan (COZAAR) 50 mg tablet; Take 1 tablet (50 mg total) by mouth daily        Subjective:      Patient ID: Justo Luz is a 76 y o  female  Jayce Moore is here to follow-up on hypertension hyperlipidemia status post AVR  Her blood pressure is lipid on the higher side  I recheck her blood pressure myself remains around 150  Denies any chest pain shortness of breath lightheadedness palpitation  We discussed increasing her losartan to 75 from 50 mg and close follow-up in a week  She is compliant with her medication  The following portions of the patient's history were reviewed and updated as appropriate: allergies, current medications, past family history, past medical history, past social history, past surgical history and problem list     Review of Systems   Constitutional: Negative for chills, fever and unexpected weight change  HENT: Negative for trouble swallowing  Eyes: Negative for visual disturbance  Respiratory: Negative for cough and shortness of breath  Cardiovascular: Negative for chest pain, palpitations and leg swelling  Gastrointestinal: Negative for abdominal distention, abdominal pain (No further dyspepsia), constipation, diarrhea and nausea  Genitourinary: Negative for dysuria  Neurological: Negative for dizziness, weakness and numbness           Objective:      /70 (BP Location: Left arm, Patient Position: Sitting, Cuff Size: Standard)   Pulse (!) 54   Temp 98 5 °F (36 9 °C) (Tympanic)   Ht 5' 2 4" (1 585 m)   Wt 75 9 kg (167 lb 6 4 oz)   SpO2 95%   BMI 30 23 kg/m²          Physical Exam   Constitutional: She is oriented to person, place, and time  She appears well-nourished  No distress  HENT:   Mouth/Throat: Oropharynx is clear and moist    Neck: No JVD present  Cardiovascular: Normal rate and regular rhythm  Murmur (Soft early systolic murmur) heard  Pulmonary/Chest: Effort normal and breath sounds normal  No respiratory distress  She has no wheezes  She has no rales  Abdominal: Soft  Bowel sounds are normal  She exhibits no distension  There is no tenderness  There is no rebound and no guarding  Musculoskeletal: She exhibits no edema  Neurological: She is alert and oriented to person, place, and time

## 2018-08-07 ENCOUNTER — CLINICAL SUPPORT (OUTPATIENT)
Dept: INTERNAL MEDICINE CLINIC | Facility: CLINIC | Age: 78
End: 2018-08-07

## 2018-08-07 VITALS — OXYGEN SATURATION: 98 % | SYSTOLIC BLOOD PRESSURE: 140 MMHG | DIASTOLIC BLOOD PRESSURE: 60 MMHG | HEART RATE: 70 BPM

## 2018-08-07 DIAGNOSIS — I10 BENIGN ESSENTIAL HYPERTENSION: Primary | ICD-10-CM

## 2018-08-07 NOTE — PROGRESS NOTES
Pt came in today to have her bp check as advised by Dr Diana Moctezuma  Losartan was recently given to the patient in place of recalled medication  Patients bp today was 140/60 on the left arm with a pulse of 70, pt states bp has been in the upper 130's and Dr Diana Moctezuma had mentioned that was ok

## 2018-08-23 ENCOUNTER — TELEPHONE (OUTPATIENT)
Dept: INTERNAL MEDICINE CLINIC | Facility: CLINIC | Age: 78
End: 2018-08-23

## 2018-08-24 DIAGNOSIS — I10 ESSENTIAL HYPERTENSION: ICD-10-CM

## 2018-08-24 RX ORDER — LOSARTAN POTASSIUM 50 MG/1
TABLET ORAL
Qty: 135 TABLET | Refills: 0
Start: 2018-08-24 | End: 2018-08-30 | Stop reason: SDUPTHER

## 2018-08-30 DIAGNOSIS — I10 ESSENTIAL HYPERTENSION: ICD-10-CM

## 2018-08-30 RX ORDER — LOSARTAN POTASSIUM 50 MG/1
TABLET ORAL
Qty: 135 TABLET | Refills: 0
Start: 2018-08-30 | End: 2018-09-04 | Stop reason: SDUPTHER

## 2018-08-30 NOTE — TELEPHONE ENCOUNTER
Losartin was ordered for [facility] but sent to retail instead of her mail order  She only has about a week left  Can you please resubmit for her  Please call [facility] to let her know when done    Thanks

## 2018-09-04 DIAGNOSIS — I10 ESSENTIAL HYPERTENSION: ICD-10-CM

## 2018-09-04 RX ORDER — LOSARTAN POTASSIUM 50 MG/1
TABLET ORAL
Qty: 20 TABLET | Refills: 0
Start: 2018-09-04 | End: 2018-09-24 | Stop reason: SDUPTHER

## 2018-09-04 NOTE — TELEPHONE ENCOUNTER
PT never received meds from [facility] and she checked this am  PT needs 1 mo of  losartan (COZAAR) 50 mg  [facility] [address]  Going out of town    Please call her as this is 2nd time difficulty using [facility]

## 2018-09-24 DIAGNOSIS — I10 ESSENTIAL HYPERTENSION: ICD-10-CM

## 2018-09-24 RX ORDER — LOSARTAN POTASSIUM 50 MG/1
TABLET ORAL
Qty: 135 TABLET | Refills: 1 | Status: SHIPPED | OUTPATIENT
Start: 2018-09-24 | End: 2019-01-09 | Stop reason: SDUPTHER

## 2018-09-24 NOTE — TELEPHONE ENCOUNTER
Please call Srinivas Bolden regarding her Losartan  She said that she still did not receive it from [address]  She is asking to have it sent to [facility] [address] for the full 90 days supply  Please look into it and call  her to clarify  She is completely out of her medication

## 2018-10-01 DIAGNOSIS — E78.5 HYPERLIPIDEMIA, UNSPECIFIED HYPERLIPIDEMIA TYPE: Primary | ICD-10-CM

## 2018-10-01 RX ORDER — SIMVASTATIN 20 MG
20 TABLET ORAL
Qty: 90 TABLET | Refills: 1 | Status: SHIPPED | OUTPATIENT
Start: 2018-10-01 | End: 2019-03-12 | Stop reason: SDUPTHER

## 2018-10-23 ENCOUNTER — OFFICE VISIT (OUTPATIENT)
Dept: INTERNAL MEDICINE CLINIC | Facility: CLINIC | Age: 78
End: 2018-10-23
Payer: MEDICARE

## 2018-10-23 VITALS
SYSTOLIC BLOOD PRESSURE: 116 MMHG | OXYGEN SATURATION: 97 % | TEMPERATURE: 97.3 F | DIASTOLIC BLOOD PRESSURE: 68 MMHG | HEIGHT: 62 IN | WEIGHT: 168 LBS | BODY MASS INDEX: 30.91 KG/M2 | HEART RATE: 61 BPM

## 2018-10-23 DIAGNOSIS — I10 ESSENTIAL HYPERTENSION: ICD-10-CM

## 2018-10-23 DIAGNOSIS — E78.2 MIXED HYPERLIPIDEMIA: Primary | ICD-10-CM

## 2018-10-23 DIAGNOSIS — Z13.89 SCREENING FOR GENITOURINARY CONDITION: ICD-10-CM

## 2018-10-23 PROCEDURE — 99214 OFFICE O/P EST MOD 30 MIN: CPT | Performed by: INTERNAL MEDICINE

## 2018-10-23 NOTE — PROGRESS NOTES
Assessment/Plan:         Diagnoses and all orders for this visit:    Mixed hyperlipidemia  -     TSH, 3rd generation  -     Lipid panel  Lab studies ordered continue current regimen  Essential hypertension  -     CBC and differential  -     Comprehensive metabolic panel   Stable continue current regimen  Screening for genitourinary condition  -     Urinalysis with reflex to microscopic    follow-up in 4 months  Subjective:      Patient ID: Ruthie Pepper is a 76 y o  female  Patient history of hypertension hyperlipidemia status post aortic valve replacement status post CABG history of breast cancer in remission is here to follow-up on chronic medical problems  I had increased her blood pressure medication  Blood pressure is excellent today  Denies any chest pain shortness of breath lightheadedness palpitation  She has an appointment to see a cardiologist in a few months  She is doing well [facility]   She does all activities that of her there  She recently had a DEXA scan for which she is following up with Dr Brant Yanez  She would also be following up with gynecologist Dr Gala Cruz this year  She is due for lab studies  I also encouraged her to seek new shingles vaccine  She has her influenza vaccine already  The following portions of the patient's history were reviewed and updated as appropriate: allergies, current medications, past family history, past medical history, past social history, past surgical history and problem list     Review of Systems   Constitutional: Negative for chills and fever  HENT: Negative for sinus pain, sinus pressure and sore throat  Respiratory: Negative for cough and shortness of breath  Cardiovascular: Negative for chest pain, palpitations and leg swelling  Gastrointestinal: Negative for abdominal pain, blood in stool, constipation and diarrhea  Genitourinary: Negative for difficulty urinating and dysuria  Neurological: Negative for dizziness  Objective:      /68 (BP Location: Left arm, Patient Position: Sitting, Cuff Size: Standard)   Pulse 61   Temp (!) 97 3 °F (36 3 °C)   Ht 5' 2 4" (1 585 m)   Wt 76 2 kg (168 lb)   SpO2 97%   BMI 30 33 kg/m²          Physical Exam   Constitutional: She is oriented to person, place, and time  She appears well-nourished  No distress  Eyes: Conjunctivae are normal    Cardiovascular: Normal rate and regular rhythm  Murmur heard  Pulmonary/Chest: Effort normal and breath sounds normal  No respiratory distress  She has no wheezes  She has no rales  Abdominal: Soft  Bowel sounds are normal  She exhibits no distension  There is no tenderness  There is no rebound and no guarding  Musculoskeletal: She exhibits no edema  Neurological: She is alert and oriented to person, place, and time  Psychiatric: She has a normal mood and affect   Her behavior is normal

## 2019-01-09 ENCOUNTER — TELEPHONE (OUTPATIENT)
Dept: FAMILY MEDICINE CLINIC | Facility: CLINIC | Age: 79
End: 2019-01-09

## 2019-01-09 DIAGNOSIS — I10 ESSENTIAL HYPERTENSION: ICD-10-CM

## 2019-01-09 RX ORDER — LOSARTAN POTASSIUM 50 MG/1
TABLET ORAL
Qty: 135 TABLET | Refills: 0 | Status: SHIPPED | OUTPATIENT
Start: 2019-01-09 | End: 2019-01-16 | Stop reason: SDUPTHER

## 2019-01-09 RX ORDER — LOSARTAN POTASSIUM 50 MG/1
TABLET ORAL
Qty: 135 TABLET | Refills: 0 | Status: CANCELLED | OUTPATIENT
Start: 2019-01-09

## 2019-01-09 NOTE — TELEPHONE ENCOUNTER
PC from pt, states she needs a refill on her Metoprolol 25 mg TID 3 mo supply with 3 refills and Losartan 50mg 1 1/2 tab daily 3 mo supply with 3 refills  Call to [facility]   Any ques, call pt at [phone]

## 2019-01-16 DIAGNOSIS — I10 ESSENTIAL HYPERTENSION: ICD-10-CM

## 2019-01-16 RX ORDER — LOSARTAN POTASSIUM 50 MG/1
TABLET ORAL
Qty: 135 TABLET | Refills: 0 | Status: SHIPPED | OUTPATIENT
Start: 2019-01-16 | End: 2019-01-24 | Stop reason: SDUPTHER

## 2019-01-16 NOTE — TELEPHONE ENCOUNTER
[facility] to [facility]  Please send a refill Metoprozlol Tartrate tabs 25 mg for 90 day supply and Losartan Potassium 50mg 90 day supply  She called originally on the 9th to get filled and I cant view her meds in chart to see if filled

## 2019-01-18 ENCOUNTER — TELEPHONE (OUTPATIENT)
Dept: FAMILY MEDICINE CLINIC | Facility: CLINIC | Age: 79
End: 2019-01-18

## 2019-01-23 DIAGNOSIS — I10 ESSENTIAL HYPERTENSION: ICD-10-CM

## 2019-01-23 NOTE — TELEPHONE ENCOUNTER
Patient called and stated that her medication was called incorrectly to the pharmacy  She takes Metoprolol Tartrate 25 mg tablet - Take 1 tablet (25 mg total) by mouth every 12 (twelve) hours 2 pills in AM 1 pill PM #270 and Losartan (COZAAR) 50 mg tablet -   1 and half pill in the morning #135     This needs to be called into [facility]

## 2019-01-23 NOTE — TELEPHONE ENCOUNTER
[facility] [phone]  Invoice number [phone]    The pharmacist called and states that there is different directions on the prescription for Metoprolol    1  Do  You want 1 tab twice day? 2  OR 2 tabs in am and 1 tab in pm?? Needs clarification      Thanks!!

## 2019-01-24 RX ORDER — LOSARTAN POTASSIUM 50 MG/1
TABLET ORAL
Qty: 135 TABLET | Refills: 0 | Status: SHIPPED | OUTPATIENT
Start: 2019-01-24 | End: 2019-02-20 | Stop reason: SDUPTHER

## 2019-01-24 NOTE — TELEPHONE ENCOUNTER
Could you please check with the patient  My understanding is she is taking 2 pills in the morning 1 pill in the evening    But she also sees a cardiologist and I wonder if the another prescription came from him because I renewed it like the above on the 1/16

## 2019-01-28 ENCOUNTER — TELEPHONE (OUTPATIENT)
Dept: FAMILY MEDICINE CLINIC | Facility: CLINIC | Age: 79
End: 2019-01-28

## 2019-01-28 NOTE — TELEPHONE ENCOUNTER
Patient called and stated that she needs a prior authorization for her Losartan 50 mg tablets-The authorization needs to go to [facility] [phone]  She said that she received a letter stating that the pharmacy needs clarification as to why she needs this dosage of the medication    Please advise [phone] spontaneous

## 2019-01-28 NOTE — TELEPHONE ENCOUNTER
Pharmacy states they do not need prior auth they need clarification and necessity of medication  Basically a generic letter stating how much medication and why she is taking this medication      thanks

## 2019-01-28 NOTE — TELEPHONE ENCOUNTER
I called [facility] and spoke to Lavon Hamilton  What they needed was clarification on the Metoprolol, not the Losartan, and they do not need any prior Auth, just clarification  I reviewed chart and gave them correct med's

## 2019-02-20 ENCOUNTER — OFFICE VISIT (OUTPATIENT)
Dept: FAMILY MEDICINE CLINIC | Facility: CLINIC | Age: 79
End: 2019-02-20
Payer: MEDICARE

## 2019-02-20 VITALS
WEIGHT: 176 LBS | HEIGHT: 63 IN | HEART RATE: 61 BPM | OXYGEN SATURATION: 97 % | BODY MASS INDEX: 31.18 KG/M2 | SYSTOLIC BLOOD PRESSURE: 114 MMHG | DIASTOLIC BLOOD PRESSURE: 78 MMHG

## 2019-02-20 DIAGNOSIS — I25.118 CORONARY ARTERY DISEASE OF NATIVE ARTERY OF NATIVE HEART WITH STABLE ANGINA PECTORIS (HCC): ICD-10-CM

## 2019-02-20 DIAGNOSIS — Z95.2 S/P AVR (AORTIC VALVE REPLACEMENT): ICD-10-CM

## 2019-02-20 DIAGNOSIS — I50.42 CHRONIC COMBINED SYSTOLIC AND DIASTOLIC CONGESTIVE HEART FAILURE DUE TO VALVULAR DISEASE (HCC): ICD-10-CM

## 2019-02-20 DIAGNOSIS — Z95.1 S/P CABG X 1: ICD-10-CM

## 2019-02-20 DIAGNOSIS — I10 ESSENTIAL HYPERTENSION: Primary | ICD-10-CM

## 2019-02-20 DIAGNOSIS — I38 CHRONIC COMBINED SYSTOLIC AND DIASTOLIC CONGESTIVE HEART FAILURE DUE TO VALVULAR DISEASE (HCC): ICD-10-CM

## 2019-02-20 PROCEDURE — 99213 OFFICE O/P EST LOW 20 MIN: CPT | Performed by: INTERNAL MEDICINE

## 2019-02-20 RX ORDER — LOSARTAN POTASSIUM 50 MG/1
TABLET ORAL
Qty: 135 TABLET | Refills: 0 | Status: SHIPPED | OUTPATIENT
Start: 2019-02-20 | End: 2019-02-20 | Stop reason: SDUPTHER

## 2019-02-20 RX ORDER — LOSARTAN POTASSIUM 50 MG/1
TABLET ORAL
Qty: 135 TABLET | Refills: 0 | Status: SHIPPED | OUTPATIENT
Start: 2019-02-20 | End: 2019-03-12 | Stop reason: CLARIF

## 2019-02-20 NOTE — PROGRESS NOTES
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 (HCC)  Stable continue current  Coronary artery disease of native artery of native heart with stable angina pectoris (HCC)  Clinically stable  S/P CABG x 1    S/P AVR (aortic valve replacement)       Subjective:      Patient ID: Gali Morales is a [de-identified] y o  female  HPI  Patient is here to follow-up on hypertension  She has been on metoprolol 25 mg twice a day and Cozaar 50 mg in the pill in half  Her blood pressure is somewhat on the higher side  She is having trouble getting Cozaar felt at 75 mg [facility] which is stressing her out quite a bit  She wants to switch her to a local pharmacy which I have no problem doing so  Denies any chest pain shortness of breath lightheadedness palpitation        The following portions of the patient's history were reviewed and updated as appropriate: allergies, current medications, past medical history, past social history, past surgical history and problem list     Current Outpatient Medications:     aspirin 81 mg chewable tablet, Chew 81 mg, Disp: , Rfl:     Biotin 10 MG TABS, Take by mouth, Disp: , Rfl:     Cholecalciferol (VITAMIN D) 2000 units CAPS, Take 3,000 Units by mouth, Disp: , Rfl:     famotidine (PEPCID) 20 mg tablet, Take 1 tablet (20 mg total) by mouth daily, Disp: 30 tablet, Rfl: 3    losartan (COZAAR) 50 mg tablet, 1 and half pill in the morning, Disp: 135 tablet, Rfl: 0    metoprolol tartrate (LOPRESSOR) 25 mg tablet, Take 1 tablet (25 mg total) by mouth every 12 (twelve) hours 2 pills in AM 1 pill PM, Disp: 90 tablet, Rfl: 0    metroNIDAZOLE (METROGEL) 1 % gel, , Disp: , Rfl:     Multiple Vitamins-Minerals (CENTRUM ADULTS PO), Take 1 tablet by mouth daily, Disp: , Rfl:     Multiple Vitamins-Minerals (PRESERVISION AREDS) CAPS, Take 1 tablet by mouth 2 (two) times a day, Disp: , Rfl:     simvastatin (ZOCOR) 20 mg tablet, Take 1 tablet (20 mg total) by mouth daily at bedtime, Disp: 90 tablet, Rfl: 1    amoxicillin (AMOXIL) 500 MG tablet, Take 4 tablets 30-60 minutes prior to dental procedures, Disp: , Rfl:   Review of Systems   Constitutional: Negative for chills and fever  Respiratory: Negative for cough and shortness of breath  Cardiovascular:        As above   Gastrointestinal: Negative for abdominal pain  Skin: Negative for rash  Neurological: Negative for dizziness  Objective:      /78 (BP Location: Left arm, Patient Position: Sitting, Cuff Size: Standard)   Pulse 61   Ht 5' 2 5" (1 588 m)   Wt 79 8 kg (176 lb)   SpO2 97%   BMI 31 68 kg/m²          Physical Exam   Constitutional: No distress  HENT:   Mouth/Throat: Oropharynx is clear and moist    Cardiovascular: Normal rate and regular rhythm  Murmur heard  Pulmonary/Chest: Effort normal and breath sounds normal  No stridor  No respiratory distress  She has no wheezes  Abdominal: Soft  Bowel sounds are normal    Musculoskeletal: She exhibits no edema

## 2019-03-08 ENCOUNTER — TELEPHONE (OUTPATIENT)
Dept: FAMILY MEDICINE CLINIC | Facility: CLINIC | Age: 79
End: 2019-03-08

## 2019-03-08 NOTE — TELEPHONE ENCOUNTER
Naty Bragg called and stated that the patient is on losartan (COZAAR) 50 mg tablet -take 1 1/2 pills in the morning, however the patient's insurance will Only cover a max dosage of 1 tablet per day  If the doctor would like to continue this dosage for the patient to an override and prior authorization needs to done   The prior authorization can be faxed to [phone] and use reference #[id number], any questions please Tiffani Sandhu at [phone]

## 2019-03-12 ENCOUNTER — OFFICE VISIT (OUTPATIENT)
Dept: FAMILY MEDICINE CLINIC | Facility: CLINIC | Age: 79
End: 2019-03-12
Payer: MEDICARE

## 2019-03-12 ENCOUNTER — TELEPHONE (OUTPATIENT)
Dept: FAMILY MEDICINE CLINIC | Facility: CLINIC | Age: 79
End: 2019-03-12

## 2019-03-12 VITALS
DIASTOLIC BLOOD PRESSURE: 90 MMHG | SYSTOLIC BLOOD PRESSURE: 160 MMHG | WEIGHT: 176 LBS | BODY MASS INDEX: 31.18 KG/M2 | HEIGHT: 63 IN | OXYGEN SATURATION: 97 % | HEART RATE: 65 BPM

## 2019-03-12 DIAGNOSIS — I10 BENIGN ESSENTIAL HYPERTENSION: Primary | ICD-10-CM

## 2019-03-12 DIAGNOSIS — E78.5 HYPERLIPIDEMIA, UNSPECIFIED HYPERLIPIDEMIA TYPE: ICD-10-CM

## 2019-03-12 DIAGNOSIS — M62.838 CERVICAL PARASPINAL MUSCLE SPASM: ICD-10-CM

## 2019-03-12 DIAGNOSIS — Z00.00 MEDICARE ANNUAL WELLNESS VISIT, SUBSEQUENT: ICD-10-CM

## 2019-03-12 PROCEDURE — G0439 PPPS, SUBSEQ VISIT: HCPCS | Performed by: INTERNAL MEDICINE

## 2019-03-12 RX ORDER — IRBESARTAN 75 MG/1
75 TABLET ORAL
Qty: 30 TABLET | Refills: 1 | Status: SHIPPED | OUTPATIENT
Start: 2019-03-12 | End: 2019-04-03 | Stop reason: SDUPTHER

## 2019-03-12 RX ORDER — SIMVASTATIN 20 MG
20 TABLET ORAL
Qty: 90 TABLET | Refills: 1 | Status: SHIPPED | OUTPATIENT
Start: 2019-03-12 | End: 2019-09-17 | Stop reason: SDUPTHER

## 2019-03-12 NOTE — PROGRESS NOTES
Assessment and Plan:  Problem List Items Addressed This Visit     None        Health Maintenance Due   Topic Date Due    BMI: Followup Plan  12/20/1958    Fall Risk  03/09/2019    Depression Screening PHQ  03/09/2019    Urinary Incontinence Screening  03/09/2019    Medicare Annual Wellness Visit (AWV)  03/09/2019         HPI:  Patient Active Problem List   Diagnosis    Benign essential hypertension    Coronary artery disease of native artery with stable angina pectoris (HCC)    Diastolic dysfunction    Disorder of bone and cartilage    Dyspepsia    Hyperlipidemia    Nontoxic multinodular goiter    Noninfiltrating intraductal carcinoma of left breast    Osteopenia    S/P AVR (aortic valve replacement)    S/P CABG x 1    Vitamin D deficiency    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 ([facility] [address] )     Past Medical History:   Diagnosis Date    Aortic valve stenosis     last assessed: 8/2/16     Past Surgical History:   Procedure Laterality Date    AORTIC VALVE REPLACEMENT      BREAST LUMPECTOMY      CORONARY ARTERY BYPASS GRAFT      TUBAL LIGATION       Family History   Problem Relation Age of Onset    Hypertension Mother     Lung cancer Father      Social History     Tobacco Use   Smoking Status Never Smoker   Smokeless Tobacco Never Used   Tobacco Comment    [employer] also states unknown if ever smoked     Social History     Substance and Sexual Activity   Alcohol Use No      Social History     Substance and Sexual Activity   Drug Use No         Current Outpatient Medications   Medication Sig Dispense Refill    amoxicillin (AMOXIL) 500 MG tablet Take 4 tablets 30-60 minutes prior to dental procedures      aspirin 81 mg chewable tablet Chew 81 mg      Biotin 10 MG TABS Take by mouth      Cholecalciferol (VITAMIN D) 2000 units CAPS Take 3,000 Units by mouth      famotidine (PEPCID) 20 mg tablet Take 1 tablet (20 mg total) by mouth daily 30 tablet 3  losartan (COZAAR) 50 mg tablet 1 and half pill in the morning 135 tablet 0    metoprolol tartrate (LOPRESSOR) 25 mg tablet Take 1 tablet (25 mg total) by mouth every 12 (twelve) hours 2 pills in AM 1 pill PM 90 tablet 0    metroNIDAZOLE (METROGEL) 1 % gel       Multiple Vitamins-Minerals (CENTRUM ADULTS PO) Take 1 tablet by mouth daily      Multiple Vitamins-Minerals (PRESERVISION AREDS) CAPS Take 1 tablet by mouth 2 (two) times a day      simvastatin (ZOCOR) 20 mg tablet Take 1 tablet (20 mg total) by mouth daily at bedtime 90 tablet 1     No current facility-administered medications for this visit        Allergies   Allergen Reactions    Chocolate Diarrhea    Epinephrine Other (See Comments) and Myalgia     "makes my heart race"  "makes my heart race"  "makes my heart race"    Latex Itching    Lisinopril Cough    Procaine Tachycardia, Other (See Comments), Palpitations and Myalgia     Other reaction(s): Tachycardia  novocaine= heart races  novocaine= heart races  novocaine= heart races    Sulfa Antibiotics Rash    Sulfasalazine Rash    Wound Dressings Rash     Immunization History   Administered Date(s) Administered    INFLUENZA 10/03/2014, 09/25/2015, 10/14/2016, 10/08/2018    Influenza Quadrivalent Preservative Free 3 years and older IM 10/14/2016    Influenza Split High Dose Preservative Free IM 10/03/2014, 10/01/2015, 11/01/2017, 10/02/2018    Influenza TIV (IM) 10/04/2000, 11/12/2001, 10/02/2002, 10/15/2003, 10/26/2004, 10/01/2007, 10/19/2007, 01/01/2009, 09/29/2011    Influenza, high dose seasonal 0 5 mL 10/02/2018    Pneumococcal Conjugate 13-Valent 07/16/2015    Pneumococcal Polysaccharide PPV23 01/17/2003, 10/28/2010    Tdap 11/06/2013    Tuberculin Skin Test-PPD Intradermal 01/30/2018, 02/06/2018    Zoster 01/01/2010       Patient Care Team:  Galina Guerra MD as PCP - General  MD Zakiya Oliveira MD Nelly Raymond, MD Ernestina Lang, MD    Medicare Screening Tests and Risk Assessments:  Adeline Clinton is here for her Subsequent Wellness visit  Health Risk Assessment:  Patient rates overall health as very good  Patient feels that their physical health rating is Same  Eyesight was rated as Same  Hearing was rated as Same  Patient feels that their emotional and mental health rating is Same  Pain experienced by patient in the last 7 days has been None  Patient states that she has experienced no weight loss or gain in last 6 months  Emotional/Mental Health:  Patient has been feeling nervous/anxious  PHQ-9 Depression Screening:    Frequency of the following problems over the past two weeks:      1  Little interest or pleasure in doing things: 0 - not at all      2  Feeling down, depressed, or hopeless: 0 - not at all  PHQ-2 Score: 0          Broken Bones/Falls: Fall Risk Assessment:    In the past year, patient has experienced: No history of falling in past year  visual disturbance    Bladder/Bowel:  Patient has not leaked urine accidently in the last six months  Patient reports no loss of bowel control  Immunizations:  Patient has had a flu vaccination within the last year  Patient has received a pneumonia shot  Patient has received a shingles shot  Patient has not received tetanus/diphtheria shot  Home Safety:  Patient does not have trouble with stairs inside or outside of their home  Patient currently reports that there are no safety hazards present in home, working smoke alarms, working carbon monoxide detectors  Preventative Screenings:   Breast cancer screening performed, no colon cancer screen completed, cholesterol screen completed, glaucoma eye exam completed,     Nutrition:  Current diet: Regular and No Added Salt with servings of the following:    Medications:  Patient is not currently taking any over-the-counter supplements  Patient is able to manage medications  Lifestyle Choices:  Patient reports no tobacco use    Patient has not smoked or used tobacco in the past   Patient reports no alcohol use  Patient drives a vehicle  Patient wears seat belt  Activities of Daily Living:  Can get out of bed by his or her self, able to dress self, able to make own meals, able to do own shopping, able to bathe self, can do own laundry/housekeeping, can manage own money, pay bills and track expenses    Previous Hospitalizations:  No hospitalization or ED visit in past 12 months        Advanced Directives:  Patient has decided on a power of   Patient has spoken to designated power of   Patient has completed advanced directive  Preventative Screening/Counseling:      Cardiovascular:      General: Screening Current          Diabetes:      General: Screening Current          Colorectal Cancer:      General: Screening Current          Breast Cancer:      General: Screening Current          Cervical Cancer:      General: Screening Not Indicated          Osteoporosis:      General: Screening Current      Counseling: Calcium and Vitamin D Intake          AAA:      General: Screening Not Indicated          Glaucoma:      General: Screening Current          HIV:      General: Screening Not Indicated          Hepatitis C:      General: Screening Not Indicated        Advanced Directives:   Patient has living will for healthcare, Information on ACP and/or AD provided  End of life assessment reviewed with patient  No exam data present    Physical Exam:  Review of Systems   Constitutional: Negative for appetite change, chills, fatigue, fever and unexpected weight change  HENT: Negative for congestion, hearing loss, postnasal drip, trouble swallowing and voice change  Eyes: Negative for pain and visual disturbance  Respiratory: Negative for cough, chest tightness and shortness of breath  Cardiovascular: Negative for chest pain, palpitations and leg swelling     Gastrointestinal: Negative for abdominal pain, blood in stool, bowel incontinence, constipation, diarrhea, nausea and vomiting  Endocrine: Negative for cold intolerance, heat intolerance, polydipsia and polyphagia  Genitourinary: Negative for difficulty urinating, flank pain, frequency and hematuria  Musculoskeletal: Positive for neck pain and neck stiffness  Negative for arthralgias, back pain, gait problem, joint swelling and myalgias  Skin: Negative for rash  Neurological: Negative for dizziness, weakness, light-headedness, numbness and headaches  Hematological: Negative for adenopathy  Does not bruise/bleed easily  Psychiatric/Behavioral: Negative for confusion, dysphoric mood and sleep disturbance  The patient is not nervous/anxious  There were no vitals filed for this visit  There is no height or weight on file to calculate BMI  Physical Exam   Constitutional: She is oriented to person, place, and time  No distress  HENT:   Mouth/Throat: Oropharynx is clear and moist    Eyes: Pupils are equal, round, and reactive to light  No scleral icterus  Neck: No thyromegaly present  Cardiovascular: Normal rate and regular rhythm  Murmur heard  Pulmonary/Chest: Effort normal and breath sounds normal  No stridor  No respiratory distress  She has no wheezes  She has no rales  Abdominal: Soft  Bowel sounds are normal  She exhibits no distension and no mass  There is no tenderness  There is no guarding  Musculoskeletal: She exhibits no edema  Increased neck muscle spasm   Lymphadenopathy:     She has no cervical adenopathy  Neurological: She is alert and oriented to person, place, and time  No cranial nerve deficit  Coordination normal    Skin: Skin is warm  She is not diaphoretic  Psychiatric: She has a normal mood and affect  Her behavior is normal      I would switch her from losartan because follow reach changed to Avapro  Patient will follow up 2 weeks after she starts ever pro for blood pressure check    I am also sending her for physical therapy at [facility] where she resides and is her choice for upper neck discomfort

## 2019-04-03 DIAGNOSIS — I10 BENIGN ESSENTIAL HYPERTENSION: ICD-10-CM

## 2019-04-03 RX ORDER — IRBESARTAN 75 MG/1
75 TABLET ORAL
Qty: 30 TABLET | Refills: 0 | Status: SHIPPED | OUTPATIENT
Start: 2019-04-03 | End: 2019-04-22 | Stop reason: SDUPTHER

## 2019-04-22 ENCOUNTER — OFFICE VISIT (OUTPATIENT)
Dept: FAMILY MEDICINE CLINIC | Facility: CLINIC | Age: 79
End: 2019-04-22
Payer: MEDICARE

## 2019-04-22 VITALS
WEIGHT: 169 LBS | HEIGHT: 63 IN | BODY MASS INDEX: 29.95 KG/M2 | SYSTOLIC BLOOD PRESSURE: 150 MMHG | DIASTOLIC BLOOD PRESSURE: 58 MMHG | HEART RATE: 59 BPM | OXYGEN SATURATION: 96 %

## 2019-04-22 DIAGNOSIS — I10 BENIGN ESSENTIAL HYPERTENSION: ICD-10-CM

## 2019-04-22 PROCEDURE — 99213 OFFICE O/P EST LOW 20 MIN: CPT | Performed by: INTERNAL MEDICINE

## 2019-04-22 RX ORDER — IRBESARTAN 75 MG/1
75 TABLET ORAL
Qty: 90 TABLET | Refills: 1 | Status: SHIPPED | OUTPATIENT
Start: 2019-04-22 | End: 2019-05-16

## 2019-05-07 DIAGNOSIS — I10 BENIGN ESSENTIAL HYPERTENSION: ICD-10-CM

## 2019-05-07 RX ORDER — IRBESARTAN 75 MG/1
75 TABLET ORAL
Qty: 30 TABLET | Refills: 1 | Status: SHIPPED | OUTPATIENT
Start: 2019-05-07 | End: 2019-05-16

## 2019-05-15 ENCOUNTER — TELEPHONE (OUTPATIENT)
Dept: FAMILY MEDICINE CLINIC | Facility: CLINIC | Age: 79
End: 2019-05-15

## 2019-05-16 ENCOUNTER — OFFICE VISIT (OUTPATIENT)
Dept: FAMILY MEDICINE CLINIC | Facility: CLINIC | Age: 79
End: 2019-05-16
Payer: MEDICARE

## 2019-05-16 VITALS
HEART RATE: 60 BPM | SYSTOLIC BLOOD PRESSURE: 166 MMHG | OXYGEN SATURATION: 96 % | WEIGHT: 167 LBS | DIASTOLIC BLOOD PRESSURE: 70 MMHG | BODY MASS INDEX: 29.59 KG/M2 | HEIGHT: 63 IN

## 2019-05-16 DIAGNOSIS — I10 ESSENTIAL HYPERTENSION: Primary | ICD-10-CM

## 2019-05-16 DIAGNOSIS — I10 BENIGN ESSENTIAL HYPERTENSION: ICD-10-CM

## 2019-05-16 DIAGNOSIS — Z95.2 S/P AVR (AORTIC VALVE REPLACEMENT): ICD-10-CM

## 2019-05-16 PROCEDURE — 99213 OFFICE O/P EST LOW 20 MIN: CPT | Performed by: INTERNAL MEDICINE

## 2019-05-16 RX ORDER — IRBESARTAN 150 MG/1
TABLET ORAL
Qty: 90 TABLET | Refills: 1 | Status: SHIPPED | OUTPATIENT
Start: 2019-05-16 | End: 2019-10-22 | Stop reason: SDUPTHER

## 2019-05-22 ENCOUNTER — CLINICAL SUPPORT (OUTPATIENT)
Dept: FAMILY MEDICINE CLINIC | Facility: CLINIC | Age: 79
End: 2019-05-22

## 2019-05-22 VITALS — SYSTOLIC BLOOD PRESSURE: 152 MMHG | DIASTOLIC BLOOD PRESSURE: 82 MMHG

## 2019-05-22 DIAGNOSIS — I10 HYPERTENSION, UNSPECIFIED TYPE: Primary | ICD-10-CM

## 2019-05-23 ENCOUNTER — TELEPHONE (OUTPATIENT)
Dept: FAMILY MEDICINE CLINIC | Facility: CLINIC | Age: 79
End: 2019-05-23

## 2019-05-24 ENCOUNTER — TELEPHONE (OUTPATIENT)
Dept: FAMILY MEDICINE CLINIC | Facility: CLINIC | Age: 79
End: 2019-05-24

## 2019-05-30 ENCOUNTER — OFFICE VISIT (OUTPATIENT)
Dept: FAMILY MEDICINE CLINIC | Facility: CLINIC | Age: 79
End: 2019-05-30
Payer: MEDICARE

## 2019-05-30 VITALS
OXYGEN SATURATION: 99 % | HEART RATE: 65 BPM | BODY MASS INDEX: 30.73 KG/M2 | WEIGHT: 167 LBS | SYSTOLIC BLOOD PRESSURE: 128 MMHG | HEIGHT: 62 IN | DIASTOLIC BLOOD PRESSURE: 72 MMHG

## 2019-05-30 DIAGNOSIS — Z95.2 S/P AVR (AORTIC VALVE REPLACEMENT): ICD-10-CM

## 2019-05-30 DIAGNOSIS — I10 BENIGN ESSENTIAL HYPERTENSION: Primary | ICD-10-CM

## 2019-05-30 PROCEDURE — 99213 OFFICE O/P EST LOW 20 MIN: CPT | Performed by: INTERNAL MEDICINE

## 2019-07-22 DIAGNOSIS — I10 ESSENTIAL HYPERTENSION: ICD-10-CM

## 2019-07-23 DIAGNOSIS — I10 ESSENTIAL HYPERTENSION: ICD-10-CM

## 2019-07-26 ENCOUNTER — TELEPHONE (OUTPATIENT)
Dept: FAMILY MEDICINE CLINIC | Facility: CLINIC | Age: 79
End: 2019-07-26

## 2019-08-29 ENCOUNTER — OFFICE VISIT (OUTPATIENT)
Dept: FAMILY MEDICINE CLINIC | Facility: CLINIC | Age: 79
End: 2019-08-29
Payer: MEDICARE

## 2019-08-29 VITALS
HEART RATE: 65 BPM | WEIGHT: 169 LBS | DIASTOLIC BLOOD PRESSURE: 64 MMHG | OXYGEN SATURATION: 96 % | HEIGHT: 62 IN | BODY MASS INDEX: 31.1 KG/M2 | SYSTOLIC BLOOD PRESSURE: 144 MMHG

## 2019-08-29 DIAGNOSIS — I10 ESSENTIAL HYPERTENSION: ICD-10-CM

## 2019-08-29 DIAGNOSIS — I10 BENIGN ESSENTIAL HYPERTENSION: Primary | ICD-10-CM

## 2019-08-29 DIAGNOSIS — R10.13 DYSPEPSIA: ICD-10-CM

## 2019-08-29 DIAGNOSIS — E78.2 MIXED HYPERLIPIDEMIA: ICD-10-CM

## 2019-08-29 PROCEDURE — 99214 OFFICE O/P EST MOD 30 MIN: CPT | Performed by: INTERNAL MEDICINE

## 2019-08-29 NOTE — PROGRESS NOTES
Assessment/Plan:         Diagnoses and all orders for this visit:    Benign essential hypertension  -     Comprehensive metabolic panel  -     metoprolol tartrate (LOPRESSOR) 25 mg tablet; 2 pills in AM 1 pill PM    Overall good control continue current regimen  Mixed hyperlipidemia  -     Comprehensive metabolic panel  -     Lipid panel  Tolerating statin continue current regimen  Dyspepsia  Symptoms control continue with H2 blocker  Subjective:      Patient ID: Doreen Griffin is a [de-identified] y o  female  HPI  Patient history of hypertension hyperlipidemia is here to follow-up on chronic medical problems  Recent lab studies were discussed with the patient  TSH is normal   Her thyroid ultrasound ordered by her endocrinologist was discussed with the patient  No suspicious mass was found  Patient's blood pressure is somewhat on the higher side but has been around 130s  Patient has been taking her medications faithfully  Has any chest pain shortness of breath lightheadedness palpitation  She is tolerating statins quite well  She will be due for lab studies soon  Dyspepsia is controlled on H2 blocker  She is scheduled for Mohs procedure forehead soon  Will be getting a flu vaccine at [facility]     The following portions of the patient's history were reviewed and updated as appropriate: allergies, current medications, past medical history, past social history, past surgical history and problem list     Review of Systems   Constitutional: Negative for chills and fever  HENT: Negative for congestion and sore throat  Respiratory: Negative for cough and shortness of breath  Cardiovascular: Negative for chest pain and leg swelling  Gastrointestinal: Negative for abdominal pain, constipation and diarrhea  Skin: Positive for rash  Neurological: Negative for dizziness  Psychiatric/Behavioral: Negative for dysphoric mood and sleep disturbance  The patient is not nervous/anxious  Objective:      /64 (BP Location: Left arm, Patient Position: Sitting, Cuff Size: Standard)   Pulse 65   Ht 5' 2 2" (1 58 m)   Wt 76 7 kg (169 lb)   SpO2 96%   BMI 30 71 kg/m²          Physical Exam   Constitutional: No distress  HENT:   Mouth/Throat: Oropharynx is clear and moist    Eyes: Conjunctivae are normal    Neck: No thyromegaly present  Cardiovascular: Normal rate and regular rhythm  Murmur heard  Pulmonary/Chest: Effort normal and breath sounds normal  No stridor  No respiratory distress  She has no wheezes  Musculoskeletal: She exhibits no edema  Lymphadenopathy:     She has no cervical adenopathy  Neurological: She is alert  BMI Counseling: Body mass index is 30 71 kg/m²  Discussed the patient's BMI with her  The BMI is above average  BMI counseling and education was provided to the patient  Nutrition recommendations include moderation in carbohydrate intake, increasing intake of lean protein and reducing intake of cholesterol

## 2019-09-17 DIAGNOSIS — E78.5 HYPERLIPIDEMIA, UNSPECIFIED HYPERLIPIDEMIA TYPE: ICD-10-CM

## 2019-09-18 RX ORDER — SIMVASTATIN 20 MG
TABLET ORAL
Qty: 90 TABLET | Refills: 4 | Status: SHIPPED | OUTPATIENT
Start: 2019-09-18 | End: 2020-11-20

## 2019-10-22 DIAGNOSIS — I10 BENIGN ESSENTIAL HYPERTENSION: ICD-10-CM

## 2019-10-23 RX ORDER — IRBESARTAN 150 MG/1
TABLET ORAL
Qty: 90 TABLET | Refills: 4 | Status: SHIPPED | OUTPATIENT
Start: 2019-10-23 | End: 2020-03-30

## 2019-10-23 RX ORDER — IRBESARTAN 150 MG/1
TABLET ORAL
Qty: 90 TABLET | Refills: 1 | Status: SHIPPED | OUTPATIENT
Start: 2019-10-23 | End: 2019-12-30

## 2019-12-30 ENCOUNTER — OFFICE VISIT (OUTPATIENT)
Dept: FAMILY MEDICINE CLINIC | Facility: CLINIC | Age: 79
End: 2019-12-30
Payer: MEDICARE

## 2019-12-30 VITALS
BODY MASS INDEX: 32.39 KG/M2 | HEIGHT: 62 IN | SYSTOLIC BLOOD PRESSURE: 138 MMHG | TEMPERATURE: 97.8 F | DIASTOLIC BLOOD PRESSURE: 70 MMHG | OXYGEN SATURATION: 96 % | WEIGHT: 176 LBS | HEART RATE: 68 BPM

## 2019-12-30 DIAGNOSIS — Z95.2 S/P AVR (AORTIC VALVE REPLACEMENT): ICD-10-CM

## 2019-12-30 DIAGNOSIS — E78.2 MIXED HYPERLIPIDEMIA: ICD-10-CM

## 2019-12-30 DIAGNOSIS — I10 BENIGN ESSENTIAL HYPERTENSION: Primary | ICD-10-CM

## 2019-12-30 DIAGNOSIS — Z95.1 S/P CABG X 1: ICD-10-CM

## 2019-12-30 DIAGNOSIS — R10.13 DYSPEPSIA: ICD-10-CM

## 2019-12-30 PROCEDURE — 99214 OFFICE O/P EST MOD 30 MIN: CPT | Performed by: INTERNAL MEDICINE

## 2019-12-30 RX ORDER — AMLODIPINE BESYLATE 5 MG/1
TABLET ORAL
COMMUNITY
Start: 2019-12-19

## 2019-12-30 NOTE — PROGRESS NOTES
Assessment/Plan:         Diagnoses and all orders for this visit:    Benign essential hypertension    Patient would start  amlodipine 5 mg a day at night  Continue to monitor her blood pressure at home  I will be seeing her back in 4 months  Dyspepsia   symptoms controlled continue with H2 blocker  Mixed hyperlipidemia   due for lab studies continue with statin  S/P CABG x 1    S/P AVR (aortic valve replacement)   as per Cardiology  Other orders  -     amLODIPine (NORVASC) 5 mg tablet        Subjective:      Patient ID: Constance Ortiz is a 78 y o  female  HPI   patient history of hypertension hyperlipidemia status post CABG aortic valve replacementosteoporosis dyspepsia history of breast cancer is here to follow-up on chronic medical problems  Patient had her 1st dose of Reclast in August this year and doing very well and is very happy about the decision  She also saw her new cardiologist   Blood pressure was high in the visit she started on amlodipine  Patient has not started the medication yet  She has several questions  which were answered today  Encouraged her to log her blood pressure twice a day most of the days of the week  She does not report any dyspepsia  about  She is on simvastatin 20 mg a day  She is due for lab studies unfortunate she did not go for blood work prior to today's visit  patient finished her shingles vaccine series as well as had a flu shot  She probably will have a next DEXA scan this year    The following portions of the patient's history were reviewed and updated as appropriate: allergies, current medications, past medical history, past social history, past surgical history and problem list     Review of Systems      Objective:      /70 (BP Location: Left arm, Patient Position: Sitting, Cuff Size: Large)   Pulse 68   Temp 97 8 °F (36 6 °C) (Tympanic)   Ht 5' 2 2" (1 58 m)   Wt 79 8 kg (176 lb)   SpO2 96%   BMI 31 98 kg/m²          Physical Exam   Constitutional: She is oriented to person, place, and time  HENT:   Mouth/Throat: Oropharynx is clear and moist    Eyes: Conjunctivae are normal    Cardiovascular: Normal rate and regular rhythm  Exam reveals no gallop and no friction rub  Murmur heard  Pulmonary/Chest: Effort normal and breath sounds normal  No stridor  No respiratory distress  She has no wheezes  Abdominal: Soft  Bowel sounds are normal  She exhibits no distension and no mass  There is no tenderness  There is no guarding  Musculoskeletal: She exhibits no edema  Neurological: She is alert and oriented to person, place, and time  She displays normal reflexes  No cranial nerve deficit  Coordination normal    Psychiatric: She has a normal mood and affect   Her behavior is normal

## 2020-03-03 DIAGNOSIS — I10 ESSENTIAL HYPERTENSION: ICD-10-CM

## 2020-03-30 DIAGNOSIS — I10 BENIGN ESSENTIAL HYPERTENSION: ICD-10-CM

## 2020-03-30 RX ORDER — IRBESARTAN 150 MG/1
TABLET ORAL
Qty: 90 TABLET | Refills: 3 | Status: SHIPPED | OUTPATIENT
Start: 2020-03-30 | End: 2021-03-25

## 2020-06-17 ENCOUNTER — OFFICE VISIT (OUTPATIENT)
Dept: FAMILY MEDICINE CLINIC | Facility: CLINIC | Age: 80
End: 2020-06-17
Payer: MEDICARE

## 2020-06-17 VITALS
HEART RATE: 75 BPM | TEMPERATURE: 97.3 F | BODY MASS INDEX: 32.76 KG/M2 | OXYGEN SATURATION: 95 % | DIASTOLIC BLOOD PRESSURE: 72 MMHG | HEIGHT: 62 IN | SYSTOLIC BLOOD PRESSURE: 104 MMHG | WEIGHT: 178 LBS

## 2020-06-17 DIAGNOSIS — I10 ESSENTIAL HYPERTENSION: ICD-10-CM

## 2020-06-17 DIAGNOSIS — Z00.00 MEDICARE ANNUAL WELLNESS VISIT, SUBSEQUENT: Primary | ICD-10-CM

## 2020-06-17 DIAGNOSIS — E78.2 MIXED HYPERLIPIDEMIA: ICD-10-CM

## 2020-06-17 DIAGNOSIS — R10.13 DYSPEPSIA: ICD-10-CM

## 2020-06-17 DIAGNOSIS — R73.9 HYPERGLYCEMIA: ICD-10-CM

## 2020-06-17 PROCEDURE — 1170F FXNL STATUS ASSESSED: CPT | Performed by: INTERNAL MEDICINE

## 2020-06-17 PROCEDURE — G0438 PPPS, INITIAL VISIT: HCPCS | Performed by: INTERNAL MEDICINE

## 2020-06-17 PROCEDURE — 3078F DIAST BP <80 MM HG: CPT | Performed by: INTERNAL MEDICINE

## 2020-06-17 PROCEDURE — 1125F AMNT PAIN NOTED PAIN PRSNT: CPT | Performed by: INTERNAL MEDICINE

## 2020-06-17 PROCEDURE — 3008F BODY MASS INDEX DOCD: CPT | Performed by: INTERNAL MEDICINE

## 2020-06-17 PROCEDURE — 4040F PNEUMOC VAC/ADMIN/RCVD: CPT | Performed by: INTERNAL MEDICINE

## 2020-06-17 PROCEDURE — 1036F TOBACCO NON-USER: CPT | Performed by: INTERNAL MEDICINE

## 2020-06-17 PROCEDURE — 3074F SYST BP LT 130 MM HG: CPT | Performed by: INTERNAL MEDICINE

## 2020-06-17 PROCEDURE — 99214 OFFICE O/P EST MOD 30 MIN: CPT | Performed by: INTERNAL MEDICINE

## 2020-06-17 PROCEDURE — 1160F RVW MEDS BY RX/DR IN RCRD: CPT | Performed by: INTERNAL MEDICINE

## 2020-11-20 DIAGNOSIS — E78.5 HYPERLIPIDEMIA, UNSPECIFIED HYPERLIPIDEMIA TYPE: ICD-10-CM

## 2020-11-20 RX ORDER — SIMVASTATIN 20 MG
TABLET ORAL
Qty: 90 TABLET | Refills: 3 | Status: SHIPPED | OUTPATIENT
Start: 2020-11-20 | End: 2021-11-15

## 2020-12-10 ENCOUNTER — OFFICE VISIT (OUTPATIENT)
Dept: FAMILY MEDICINE CLINIC | Facility: CLINIC | Age: 80
End: 2020-12-10
Payer: MEDICARE

## 2020-12-10 VITALS
OXYGEN SATURATION: 96 % | DIASTOLIC BLOOD PRESSURE: 70 MMHG | BODY MASS INDEX: 33.65 KG/M2 | HEART RATE: 67 BPM | WEIGHT: 184 LBS | SYSTOLIC BLOOD PRESSURE: 130 MMHG | TEMPERATURE: 96.3 F

## 2020-12-10 DIAGNOSIS — Z13.21 ENCOUNTER FOR VITAMIN DEFICIENCY SCREENING: ICD-10-CM

## 2020-12-10 DIAGNOSIS — I50.42 CHRONIC COMBINED SYSTOLIC AND DIASTOLIC CONGESTIVE HEART FAILURE DUE TO VALVULAR DISEASE (HCC): ICD-10-CM

## 2020-12-10 DIAGNOSIS — I10 ESSENTIAL HYPERTENSION: ICD-10-CM

## 2020-12-10 DIAGNOSIS — I38 CHRONIC COMBINED SYSTOLIC AND DIASTOLIC CONGESTIVE HEART FAILURE DUE TO VALVULAR DISEASE (HCC): ICD-10-CM

## 2020-12-10 DIAGNOSIS — I25.118 CORONARY ARTERY DISEASE OF NATIVE ARTERY OF NATIVE HEART WITH STABLE ANGINA PECTORIS (HCC): ICD-10-CM

## 2020-12-10 DIAGNOSIS — E78.5 HYPERLIPIDEMIA, UNSPECIFIED HYPERLIPIDEMIA TYPE: Primary | ICD-10-CM

## 2020-12-10 DIAGNOSIS — R73.9 HYPERGLYCEMIA: ICD-10-CM

## 2020-12-10 DIAGNOSIS — Z13.29 THYROID DISORDER SCREENING: ICD-10-CM

## 2020-12-10 PROCEDURE — 99214 OFFICE O/P EST MOD 30 MIN: CPT | Performed by: INTERNAL MEDICINE

## 2021-03-25 DIAGNOSIS — I10 BENIGN ESSENTIAL HYPERTENSION: ICD-10-CM

## 2021-03-25 RX ORDER — IRBESARTAN 150 MG/1
TABLET ORAL
Qty: 90 TABLET | Refills: 3 | Status: SHIPPED | OUTPATIENT
Start: 2021-03-25 | End: 2022-03-21

## 2021-05-20 DIAGNOSIS — I10 ESSENTIAL HYPERTENSION: ICD-10-CM

## 2021-06-29 ENCOUNTER — OFFICE VISIT (OUTPATIENT)
Dept: FAMILY MEDICINE CLINIC | Facility: CLINIC | Age: 81
End: 2021-06-29
Payer: MEDICARE

## 2021-06-29 VITALS
SYSTOLIC BLOOD PRESSURE: 132 MMHG | WEIGHT: 182 LBS | BODY MASS INDEX: 33.49 KG/M2 | HEIGHT: 62 IN | OXYGEN SATURATION: 95 % | TEMPERATURE: 97.1 F | HEART RATE: 66 BPM | DIASTOLIC BLOOD PRESSURE: 70 MMHG

## 2021-06-29 DIAGNOSIS — E78.5 HYPERLIPIDEMIA, UNSPECIFIED HYPERLIPIDEMIA TYPE: ICD-10-CM

## 2021-06-29 DIAGNOSIS — D05.12 NONINFILTRATING INTRADUCTAL CARCINOMA OF LEFT BREAST: ICD-10-CM

## 2021-06-29 DIAGNOSIS — Z95.2 S/P AVR (AORTIC VALVE REPLACEMENT): Primary | ICD-10-CM

## 2021-06-29 DIAGNOSIS — I10 ESSENTIAL HYPERTENSION: ICD-10-CM

## 2021-06-29 DIAGNOSIS — Z00.00 MEDICARE ANNUAL WELLNESS VISIT, SUBSEQUENT: ICD-10-CM

## 2021-06-29 DIAGNOSIS — Z95.1 S/P CABG X 1: ICD-10-CM

## 2021-06-29 PROCEDURE — G0439 PPPS, SUBSEQ VISIT: HCPCS | Performed by: INTERNAL MEDICINE

## 2021-06-29 PROCEDURE — 1123F ACP DISCUSS/DSCN MKR DOCD: CPT | Performed by: INTERNAL MEDICINE

## 2021-06-29 PROCEDURE — 99214 OFFICE O/P EST MOD 30 MIN: CPT | Performed by: INTERNAL MEDICINE

## 2021-06-29 NOTE — PROGRESS NOTES
Ashleigh Evans is here for her Subsequent Wellness visit  Health Risk Assessment:   Patient rates overall health as good  Patient feels that their physical health rating is slightly better  Patient is satisfied with their life  Eyesight was rated as same  Hearing was rated as same  Patient feels that their emotional and mental health rating is slightly better  Patients states they are never, rarely angry  Patient states they are sometimes unusually tired/fatigued  Pain experienced in the last 7 days has been none  Patient states that she has experienced no weight loss or gain in last 6 months  Depression Screening:   PHQ-2 Score: 0      Fall Risk Screening: In the past year, patient has experienced: no history of falling in past year      Urinary Incontinence Screening:   Patient has not leaked urine accidently in the last six months  Home Safety:  Patient does not have trouble with stairs inside or outside of their home  Patient has working smoke alarms and has working carbon monoxide detector  Home safety hazards include: none  Nutrition:   Current diet is Regular  Medications:   Patient is currently taking over-the-counter supplements  OTC medications include: see medication list  Patient is able to manage medications  Activities of Daily Living (ADLs)/Instrumental Activities of Daily Living (IADLs):   Walk and transfer into and out of bed and chair?: Yes  Dress and groom yourself?: Yes    Bathe or shower yourself?: Yes    Feed yourself? Yes  Do your laundry/housekeeping?: Yes  Manage your money, pay your bills and track your expenses?: Yes  Make your own meals?: Yes    Do your own shopping?: Yes    Previous Hospitalizations:   Any hospitalizations or ED visits within the last 12 months?: No      Advance Care Planning:   Living will: Yes    Durable POA for healthcare:  Yes    Advanced directive: No      Comments: Son  and sister    Cognitive Screening:   Provider or family/friend/caregiver concerned regarding cognition?: No    PREVENTIVE SCREENINGS      Cardiovascular Screening:    General: Screening Not Indicated, History Lipid Disorder and Screening Current      Diabetes Screening:     General: Screening Current      Colorectal Cancer Screening:     General: Screening Not Indicated      Breast Cancer Screening:     General: Screening Current      Cervical Cancer Screening:    General: Screening Not Indicated      Osteoporosis Screening:    General: History Osteoporosis and Screening Current      Abdominal Aortic Aneurysm (AAA) Screening:        General: Screening Not Indicated      Lung Cancer Screening:     General: Screening Not Indicated      Hepatitis C Screening:    General: Screening Not Indicated    Hep C Screening Accepted: No     Screening, Brief Intervention, and Referral to Treatment (SBIRT)    Screening  Typical number of drinks in a day: 0  Typical number of drinks in a week: 0  Interpretation: Low risk drinking behavior      Single Item Drug Screening:  How often have you used an illegal drug (including marijuana) or a prescription medication for non-medical reasons in the past year? never    Single Item Drug Screen Score: 0  Interpretation: Negative screen for possible drug use disorder

## 2021-06-29 NOTE — PROGRESS NOTES
Assessment/Plan:         Diagnoses and all orders for this visit:  Hyperlipidemia, unspecified hyperlipidemia type    Tolerating statin LDL at target continue current regimen  Hypertension   controlled continue current regimen  Noninfiltrating intraductal carcinoma of left breast   up-to-date mammogram this year    S/P AVR (aortic valve replacement)    S/P CABG x 1    Medicare annual wellness visit, subsequent      Health preventative screening and vaccination up-to-date    Subjective:      Patient ID: Sun Lovelace is a 78 y o  female  HPI    Patient history of hypertension hyperlipidemia status post TAVR history of CABG history of breast cancer in the past is here to follow-up on chronic medical problems she is doing quite well and has no major complaints  Blood pressure is good  She is tolerating statins well  She does not report any age chest pain shortness of breath lightheadedness  She has not had any events since she had TAVR  Following up regularly with her cardiologist    Recent lab studies were discussed which were in normal range  She will be going for another DEXA scan  The following portions of the patient's history were reviewed and updated as appropriate: allergies, current medications, past family history, past medical history, past social history, past surgical history and problem list     Review of Systems   Constitutional: Negative for chills, fever and unexpected weight change  Respiratory: Negative for cough and shortness of breath  Cardiovascular: Negative for chest pain, palpitations and leg swelling  Gastrointestinal: Negative for abdominal pain, blood in stool, constipation and diarrhea  Genitourinary: Negative for dysuria  Neurological: Negative for dizziness  Psychiatric/Behavioral: Negative for dysphoric mood and sleep disturbance  The patient is not nervous/anxious            Objective:      /70 (BP Location: Left arm, Patient Position: Sitting, Cuff Size: Standard)   Pulse 66   Temp (!) 97 1 °F (36 2 °C)   Ht 5' 2" (1 575 m)   Wt 82 6 kg (182 lb)   SpO2 95%   BMI 33 29 kg/m²          Physical Exam  Neck:      Vascular: No carotid bruit  Cardiovascular:      Rate and Rhythm: Normal rate and regular rhythm  Heart sounds: Murmur heard  Pulmonary:      Effort: Pulmonary effort is normal  No respiratory distress  Breath sounds: Normal breath sounds  No wheezing or rales  Abdominal:      General: Bowel sounds are normal  There is no distension  Tenderness: There is no abdominal tenderness  There is no guarding  Musculoskeletal:      Right lower leg: No edema  Left lower leg: No edema  Neurological:      Mental Status: She is alert  Psychiatric:         Mood and Affect: Mood normal          Behavior: Behavior normal              BMI Counseling: Body mass index is 33 29 kg/m²  The BMI is above normal  Nutrition recommendations include moderation in carbohydrate intake, increasing intake of lean protein, reducing intake of saturated and trans fat and reducing intake of cholesterol  Exercise recommendations include exercising 3-5 times per week

## 2021-06-30 ENCOUNTER — TELEPHONE (OUTPATIENT)
Dept: FAMILY MEDICINE CLINIC | Facility: CLINIC | Age: 81
End: 2021-06-30

## 2021-11-15 DIAGNOSIS — E78.5 HYPERLIPIDEMIA, UNSPECIFIED HYPERLIPIDEMIA TYPE: ICD-10-CM

## 2021-11-15 RX ORDER — SIMVASTATIN 20 MG
TABLET ORAL
Qty: 90 TABLET | Refills: 3 | Status: SHIPPED | OUTPATIENT
Start: 2021-11-15

## 2022-03-08 ENCOUNTER — OFFICE VISIT (OUTPATIENT)
Dept: FAMILY MEDICINE CLINIC | Facility: CLINIC | Age: 82
End: 2022-03-08
Payer: MEDICARE

## 2022-03-08 VITALS
BODY MASS INDEX: 33.2 KG/M2 | OXYGEN SATURATION: 97 % | DIASTOLIC BLOOD PRESSURE: 74 MMHG | HEART RATE: 65 BPM | TEMPERATURE: 98.3 F | SYSTOLIC BLOOD PRESSURE: 116 MMHG | WEIGHT: 180.4 LBS | HEIGHT: 62 IN

## 2022-03-08 DIAGNOSIS — I50.42 CHRONIC COMBINED SYSTOLIC AND DIASTOLIC CONGESTIVE HEART FAILURE DUE TO VALVULAR DISEASE (HCC): ICD-10-CM

## 2022-03-08 DIAGNOSIS — I38 CHRONIC COMBINED SYSTOLIC AND DIASTOLIC CONGESTIVE HEART FAILURE DUE TO VALVULAR DISEASE (HCC): ICD-10-CM

## 2022-03-08 DIAGNOSIS — I10 ESSENTIAL HYPERTENSION: ICD-10-CM

## 2022-03-08 DIAGNOSIS — E78.5 HYPERLIPIDEMIA, UNSPECIFIED HYPERLIPIDEMIA TYPE: Primary | ICD-10-CM

## 2022-03-08 DIAGNOSIS — Z95.2 S/P AVR (AORTIC VALVE REPLACEMENT): ICD-10-CM

## 2022-03-08 DIAGNOSIS — I25.118 CORONARY ARTERY DISEASE OF NATIVE ARTERY OF NATIVE HEART WITH STABLE ANGINA PECTORIS (HCC): ICD-10-CM

## 2022-03-08 DIAGNOSIS — Z13.29 THYROID DISORDER SCREENING: ICD-10-CM

## 2022-03-08 DIAGNOSIS — E55.9 VITAMIN D DEFICIENCY: ICD-10-CM

## 2022-03-08 PROCEDURE — 99214 OFFICE O/P EST MOD 30 MIN: CPT | Performed by: INTERNAL MEDICINE

## 2022-03-08 RX ORDER — ATORVASTATIN CALCIUM 40 MG/1
TABLET, FILM COATED ORAL
COMMUNITY
Start: 2022-02-06

## 2022-03-08 NOTE — PROGRESS NOTES
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 ([address] )     Wt Readings from Last 3 Encounters:   03/08/22 81 8 kg (180 lb 6 4 oz)   06/29/21 82 6 kg (182 lb)   03/03/21 82 1 kg (181 lb)        euvolemic                 Other    Hyperlipidemia - Primary    Relevant Medications    atorvastatin (LIPITOR) 40 mg tablet    Other Relevant Orders    Lipid panel    S/P AVR (aortic valve replacement)    Vitamin D deficiency    Relevant Orders    Vitamin D Panel      Other Visit Diagnoses     hypertension        Relevant Orders    CBC and differential    Comprehensive metabolic panel    UA (URINE) with reflex to Scope    Thyroid disorder screening        Relevant Orders    TSH, 3rd generation        continue current regimen  Patient notified about new pneumonia vaccination which is not available in the office yet  I will see patient back in 4-6 months    Subjective:      Patient ID: Crystal Wiseman is a 78 y o  female  HPI  Patient history of hypertension hyperlipidemia aortic valve replacement secondary to bicuspid aortic valve is here to follow-up on chronic medical problems  She is doing very well blood pressure is excellent  She reports no lower extremity edema shortness of breath  She has independent ADLs  She is tolerating statins well  She will be due for lab studies soon  She is up-to-date with mammogram   She will be following up with her rheumatologist for osteoporosis and DEXA scan    The following portions of the patient's history were reviewed and updated as appropriate: allergies, current medications, past family history, past medical history, past social history, past surgical history and problem list     Review of Systems      Objective:      /74 (BP Location: Left arm, Patient Position: Sitting, Cuff Size: Standard)   Pulse 65   Temp 98 3 °F (36 8 °C)   Ht 5' 2" (1 575 m)   Wt 81 8 kg (180 lb 6 4 oz)   SpO2 97%   BMI 33 00 kg/m²          Physical Exam  Cardiovascular:      Rate and Rhythm: Normal rate and regular rhythm  Heart sounds: Murmur heard  Pulmonary:      Effort: Pulmonary effort is normal  No respiratory distress  Breath sounds: Normal breath sounds  No wheezing or rales  Abdominal:      General: There is no distension  Palpations: Abdomen is soft  Tenderness: There is no abdominal tenderness  There is no guarding  Musculoskeletal:      Right lower leg: No edema  Left lower leg: No edema  Neurological:      Mental Status: She is alert

## 2022-03-08 NOTE — ASSESSMENT & PLAN NOTE
Wt Readings from Last 3 Encounters:   03/08/22 81 8 kg (180 lb 6 4 oz)   06/29/21 82 6 kg (182 lb)   03/03/21 82 1 kg (181 lb)        euvolemic

## 2022-03-21 DIAGNOSIS — I10 BENIGN ESSENTIAL HYPERTENSION: ICD-10-CM

## 2022-03-21 RX ORDER — IRBESARTAN 150 MG/1
TABLET ORAL
Qty: 90 TABLET | Refills: 3 | Status: SHIPPED | OUTPATIENT
Start: 2022-03-21

## 2022-04-11 ENCOUNTER — TELEPHONE (OUTPATIENT)
Dept: FAMILY MEDICINE CLINIC | Facility: CLINIC | Age: 82
End: 2022-04-11

## 2022-04-11 NOTE — TELEPHONE ENCOUNTER
Pt, would like to know if you are recommend the 2nd booster shot?  They are offering it where she lives but she would like to know your opinion first

## 2022-05-16 DIAGNOSIS — I10 ESSENTIAL HYPERTENSION: ICD-10-CM

## 2022-07-21 ENCOUNTER — OFFICE VISIT (OUTPATIENT)
Dept: FAMILY MEDICINE CLINIC | Facility: CLINIC | Age: 82
End: 2022-07-21
Payer: MEDICARE

## 2022-07-21 VITALS
HEART RATE: 71 BPM | BODY MASS INDEX: 33.6 KG/M2 | WEIGHT: 182.6 LBS | SYSTOLIC BLOOD PRESSURE: 120 MMHG | OXYGEN SATURATION: 95 % | HEIGHT: 62 IN | DIASTOLIC BLOOD PRESSURE: 70 MMHG | TEMPERATURE: 98.3 F

## 2022-07-21 DIAGNOSIS — I25.118 CORONARY ARTERY DISEASE OF NATIVE ARTERY OF NATIVE HEART WITH STABLE ANGINA PECTORIS (HCC): ICD-10-CM

## 2022-07-21 DIAGNOSIS — I10 ESSENTIAL HYPERTENSION: ICD-10-CM

## 2022-07-21 DIAGNOSIS — Z95.2 S/P AVR (AORTIC VALVE REPLACEMENT): ICD-10-CM

## 2022-07-21 DIAGNOSIS — Z12.31 ENCOUNTER FOR SCREENING MAMMOGRAM FOR MALIGNANT NEOPLASM OF BREAST: Primary | ICD-10-CM

## 2022-07-21 DIAGNOSIS — Z78.0 POSTMENOPAUSAL: ICD-10-CM

## 2022-07-21 DIAGNOSIS — Z00.00 MEDICARE ANNUAL WELLNESS VISIT, SUBSEQUENT: ICD-10-CM

## 2022-07-21 DIAGNOSIS — E78.5 HYPERLIPIDEMIA, UNSPECIFIED HYPERLIPIDEMIA TYPE: ICD-10-CM

## 2022-07-21 PROCEDURE — G0439 PPPS, SUBSEQ VISIT: HCPCS | Performed by: INTERNAL MEDICINE

## 2022-07-21 NOTE — PROGRESS NOTES
Assessment and Plan:     Problem List Items Addressed This Visit        Cardiovascular and Mediastinum    Coronary artery disease of native artery with stable angina pectoris ([facility] [address] )       Other    Hyperlipidemia    S/P AVR (aortic valve replacement)      Other Visit Diagnoses     Encounter for screening mammogram for malignant neoplasm of breast    -  Primary    Relevant Orders    Mammo screening bilateral w 3d & cad    Medicare annual wellness visit, subsequent        hypertension        Postmenopausal        Relevant Orders    DXA bone density spine hip and pelvis           Preventive health issues were discussed with patient, and age appropriate screening tests were ordered as noted in patient's After Visit Summary  Personalized health advice and appropriate referrals for health education or preventive services given if needed, as noted in patient's After Visit Summary       History of Present Illness:     Patient presents for a Medicare Wellness Visit    HPI   Patient Care Team:  Galdino Hull MD as PCP - MD Cristina Soriano MD Assunta Halls, MD Julianne Redder, MD     Review of Systems:     Review of Systems     Problem List:     Patient Active Problem List   Diagnosis    Benign essential hypertension    Coronary artery disease of native artery with stable angina pectoris ([facility] [address] )    Diastolic dysfunction    Disorder of bone and cartilage    Dyspepsia    Hyperlipidemia    Nontoxic multinodular goiter    Noninfiltrating intraductal carcinoma of left breast    Osteopenia    S/P AVR (aortic valve replacement)    S/P CABG x 1    Vitamin D deficiency    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 [facility])      Past Medical and Surgical History:     Past Medical History:   Diagnosis Date    Aortic valve stenosis     last assessed: 8/2/16     Past Surgical History:   Procedure Laterality Date    AORTIC VALVE REPLACEMENT      BREAST LUMPECTOMY      CORONARY ARTERY BYPASS GRAFT      TUBAL LIGATION        Family History:     Family History   Problem Relation Age of Onset    Hypertension Mother     Lung cancer Father       Social History:     Social History     Socioeconomic History    Marital status:       Spouse name: None    Number of children: None    Years of education: None    Highest education level: None   Occupational History    None   Tobacco Use    Smoking status: Never Smoker    Smokeless tobacco: Never Used    Tobacco comment: [employer] also states unknown if ever smoked   Vaping Use    Vaping Use: Never used   Substance and Sexual Activity    Alcohol use: No    Drug use: No    Sexual activity: Not Currently   Other Topics Concern    None   Social History Narrative    Does not consume caffeine     Social Determinants of Health     Financial Resource Strain: Not on file   Food Insecurity: Not on file   Transportation Needs: Not on file   Physical Activity: Not on file   Stress: Not on file   Social Connections: Not on file   Intimate Partner Violence: Not on file   Housing Stability: Not on file      Medications and Allergies:     Current Outpatient Medications   Medication Sig Dispense Refill    amLODIPine (NORVASC) 5 mg tablet       amoxicillin (AMOXIL) 500 MG tablet Take 4 tablets 30-60 minutes prior to dental procedures      aspirin 81 mg chewable tablet Chew 81 mg      atorvastatin (LIPITOR) 40 mg tablet       Biotin 10 MG TABS Take by mouth      Cholecalciferol (VITAMIN D) 2000 units CAPS Take 3,000 Units by mouth      irbesartan (AVAPRO) 150 mg tablet TAKE 1 TABLET AT NOON 90 tablet 3    metoprolol tartrate (LOPRESSOR) 25 mg tablet TAKE 2 TABLETS IN THE MORNING AND 1 TABLET IN THE EVENING 270 tablet 3    metroNIDAZOLE (METROGEL) 1 % gel       famotidine (PEPCID) 20 mg tablet Take 1 tablet (20 mg total) by mouth daily (Patient not taking: No sig reported) 30 tablet 3    Multiple Vitamins-Minerals (CENTRUM ADULTS PO) Take 1 tablet by mouth daily (Patient not taking: No sig reported)      Multiple Vitamins-Minerals (PRESERVISION AREDS) CAPS Take 1 tablet by mouth 2 (two) times a day (Patient not taking: No sig reported)      simvastatin (ZOCOR) 20 mg tablet TAKE 1 TABLET DAILY AT BEDTIME (Patient not taking: No sig reported) 90 tablet 3     No current facility-administered medications for this visit       Allergies   Allergen Reactions    Chocolate - Food Allergy Diarrhea    Epinephrine Other (See Comments) and Myalgia     "makes my heart race"  "makes my heart race"  "makes my heart race"    Latex Itching    Lisinopril Cough    Procaine Tachycardia, Other (See Comments), Palpitations and Myalgia     Other reaction(s): Tachycardia  novocaine= heart races  novocaine= heart races  novocaine= heart races    Sulfa Antibiotics Rash    Sulfasalazine Rash    Wound Dressings Rash      Immunizations:     Immunization History   Administered Date(s) Administered    COVID-19 MODERNA VACC 0 5 ML IM 01/17/2021, 02/14/2021, 11/01/2021, 04/18/2022    INFLUENZA 10/03/2014, 09/25/2015, 10/14/2016, 10/08/2018    Influenza Quadrivalent Preservative Free 3 years and older IM 10/14/2016    Influenza Split High Dose Preservative Free IM 10/03/2014, 10/01/2015, 11/01/2017, 10/02/2018, 09/17/2019    Influenza, high dose seasonal 0 7 mL 10/02/2018, 09/15/2020    Influenza, seasonal, injectable 10/04/2000, 11/12/2001, 10/02/2002, 10/15/2003, 10/26/2004, 10/01/2007, 10/19/2007, 01/01/2009, 09/29/2011    Pneumococcal Conjugate 13-Valent 07/16/2015    Pneumococcal Polysaccharide PPV23 01/17/2003, 10/28/2010    Tdap 11/06/2013    Tuberculin Skin Test-PPD Intradermal 01/30/2018, 02/06/2018    Zoster 01/01/2010    Zoster Vaccine Recombinant 04/03/2019, 06/08/2019      Health Maintenance:         Topic Date Due    Breast Cancer Screening: Mammogram  01/24/2021         Topic Date Due    Influenza Vaccine (1) 09/01/2022      Medicare Screening Tests and Risk Assessments:     Curtis Lucia is here for her Subsequent Wellness visit  Health Risk Assessment:   Patient rates overall health as very good  Patient feels that their physical health rating is same  Patient is very satisfied with their life  Eyesight was rated as same  Hearing was rated as same  Patient feels that their emotional and mental health rating is same  Patients states they are never, rarely angry  Patient states they are sometimes unusually tired/fatigued  Pain experienced in the last 7 days has been none  Patient states that she has experienced no weight loss or gain in last 6 months  Depression Screening:   PHQ-2 Score: 0      Fall Risk Screening: In the past year, patient has experienced: no history of falling in past year      Urinary Incontinence Screening:   Patient has not leaked urine accidently in the last six months  Home Safety:  Patient does not have trouble with stairs inside or outside of their home  Patient has working smoke alarms and has no working carbon monoxide detector  Home safety hazards include: none  Nutrition:   Current diet is Regular  Medications:   Patient is currently taking over-the-counter supplements  OTC medications include: see medication list  Patient is able to manage medications  Activities of Daily Living (ADLs)/Instrumental Activities of Daily Living (IADLs):   Walk and transfer into and out of bed and chair?: Yes  Dress and groom yourself?: Yes    Bathe or shower yourself?: Yes    Feed yourself? Yes  Do your laundry/housekeeping?: Yes  Manage your money, pay your bills and track your expenses?: Yes  Make your own meals?: Yes    Do your own shopping?: Yes    Advance Care Planning:   Living will: Yes    Durable POA for healthcare:  Yes    Advanced directive: Yes      Cognitive Screening:   Provider or family/friend/caregiver concerned regarding cognition?: No    PREVENTIVE SCREENINGS Cardiovascular Screening:    General: Screening Not Indicated, History Lipid Disorder and Screening Current      Diabetes Screening:     General: Screening Current      Colorectal Cancer Screening:     General: Screening Not Indicated      Breast Cancer Screening:     General: Screening Current      Cervical Cancer Screening:    General: Screening Not Indicated      Osteoporosis Screening:      Due for: DXA Axial      Abdominal Aortic Aneurysm (AAA) Screening:        General: Screening Not Indicated      Lung Cancer Screening:     General: Screening Not Indicated      Hepatitis C Screening:    General: Screening Not Indicated    No exam data present     Physical Exam:     /70 (BP Location: Left arm, Patient Position: Sitting, Cuff Size: Large)   Pulse 71   Temp 98 3 °F (36 8 °C) (Tympanic)   Ht 5' 2" (1 575 m)   Wt 82 8 kg (182 lb 9 6 oz)   SpO2 95%   BMI 33 40 kg/m²     Physical Exam  Cardiovascular:      Rate and Rhythm: Normal rate and regular rhythm  Heart sounds: Murmur heard  Pulmonary:      Effort: Pulmonary effort is normal  No respiratory distress  Breath sounds: No wheezing or rales  Abdominal:      General: There is no distension  Tenderness: There is no abdominal tenderness  There is no guarding  Musculoskeletal:      Right lower leg: No edema  Left lower leg: No edema  Neurological:      Mental Status: She is alert            Arabella Rios MD

## 2023-01-17 ENCOUNTER — OFFICE VISIT (OUTPATIENT)
Dept: FAMILY MEDICINE CLINIC | Facility: CLINIC | Age: 83
End: 2023-01-17

## 2023-01-17 VITALS
DIASTOLIC BLOOD PRESSURE: 68 MMHG | RESPIRATION RATE: 16 BRPM | TEMPERATURE: 97.6 F | WEIGHT: 180.8 LBS | BODY MASS INDEX: 33.27 KG/M2 | HEIGHT: 62 IN | HEART RATE: 64 BPM | OXYGEN SATURATION: 97 % | SYSTOLIC BLOOD PRESSURE: 150 MMHG

## 2023-01-17 DIAGNOSIS — E78.5 HYPERLIPIDEMIA, UNSPECIFIED HYPERLIPIDEMIA TYPE: ICD-10-CM

## 2023-01-17 DIAGNOSIS — Z23 IMMUNIZATION DUE: Primary | ICD-10-CM

## 2023-01-17 DIAGNOSIS — I10 ESSENTIAL HYPERTENSION: ICD-10-CM

## 2023-01-17 DIAGNOSIS — Z95.2 S/P AVR (AORTIC VALVE REPLACEMENT): ICD-10-CM

## 2023-01-17 DIAGNOSIS — I38 CHRONIC COMBINED SYSTOLIC AND DIASTOLIC CONGESTIVE HEART FAILURE DUE TO VALVULAR DISEASE (HCC): ICD-10-CM

## 2023-01-17 DIAGNOSIS — I25.118 CORONARY ARTERY DISEASE OF NATIVE ARTERY OF NATIVE HEART WITH STABLE ANGINA PECTORIS (HCC): ICD-10-CM

## 2023-01-17 DIAGNOSIS — I50.42 CHRONIC COMBINED SYSTOLIC AND DIASTOLIC CONGESTIVE HEART FAILURE DUE TO VALVULAR DISEASE (HCC): ICD-10-CM

## 2023-01-17 NOTE — PROGRESS NOTES
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 [address] )  Wt Readings from Last 3 Encounters:   01/17/23 82 kg (180 lb 12 8 oz)   01/04/23 81 2 kg (179 lb)   07/21/22 82 8 kg (182 lb 9 6 oz)   Compensated continue current regimen            Coronary artery disease of native artery with stable angina pectoris (HCC)  No active problems continue current regimen         Problem List Items Addressed This Visit        Cardiovascular and Mediastinum    Coronary artery disease of native artery with stable angina pectoris (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 )     Wt Readings from Last 3 Encounters:   01/17/23 82 kg (180 lb 12 8 oz)   01/04/23 81 2 kg (179 lb)   07/21/22 82 8 kg (182 lb 9 6 oz)   Compensated continue current regimen                    Other    Hyperlipidemia    Relevant Orders    Comprehensive metabolic panel    Lipid panel    CBC and differential    S/P AVR (aortic valve replacement)   Other Visit Diagnoses     hypertension    -  Primary            Subjective:      Patient ID: Gloria Loomis is a 78 y o  female  HPI  Patient with history of hypertension hyperlipidemia status post-TAVR is here to follow-up on chronic medical problems  Her blood pressure is slightly on the higher side today as she misplaced her insurance cards  She reports no cardiac symptoms  She has been compliant with her medication  She is taking his statins regularly  Last cardiology consultation reviewed  Patient is due for lab studies  She is also due for Prevnar 20 which we ordered today    Patient is due for DEXA scan and mammogram    The following portions of the patient's history were reviewed and updated as appropriate: allergies, current medications, past medical history, past social history, past surgical history and problem list     Review of Systems      Objective:      /68 (BP Location: Left arm, Patient Position: Sitting, Cuff Size: Large)   Pulse 64   Temp 97 6 °F (36 4 °C) (Tympanic)   Resp 16   Ht 5' 2" (1 575 m)   Wt 82 kg (180 lb 12 8 oz)   SpO2 97%   BMI 33 07 kg/m²          Physical Exam  Cardiovascular:      Rate and Rhythm: Normal rate and regular rhythm  Heart sounds: Normal heart sounds  No murmur heard  Pulmonary:      Effort: Pulmonary effort is normal  No respiratory distress  Breath sounds: No wheezing or rales  Musculoskeletal:      Right lower leg: No edema  Left lower leg: No edema  Neurological:      Mental Status: She is alert

## 2023-01-17 NOTE — ASSESSMENT & PLAN NOTE
Wt Readings from Last 3 Encounters:   01/17/23 82 kg (180 lb 12 8 oz)   01/04/23 81 2 kg (179 lb)   07/21/22 82 8 kg (182 lb 9 6 oz)   Compensated continue current regimen

## 2023-03-15 DIAGNOSIS — I10 BENIGN ESSENTIAL HYPERTENSION: ICD-10-CM

## 2023-03-15 RX ORDER — IRBESARTAN 150 MG/1
TABLET ORAL
Qty: 90 TABLET | Refills: 3 | Status: SHIPPED | OUTPATIENT
Start: 2023-03-15

## 2023-05-10 DIAGNOSIS — I10 ESSENTIAL HYPERTENSION: ICD-10-CM

## 2023-05-15 ENCOUNTER — OFFICE VISIT (OUTPATIENT)
Dept: FAMILY MEDICINE CLINIC | Facility: CLINIC | Age: 83
End: 2023-05-15

## 2023-05-15 VITALS
HEIGHT: 62 IN | RESPIRATION RATE: 18 BRPM | BODY MASS INDEX: 32.39 KG/M2 | DIASTOLIC BLOOD PRESSURE: 76 MMHG | WEIGHT: 176 LBS | SYSTOLIC BLOOD PRESSURE: 172 MMHG | HEART RATE: 68 BPM | TEMPERATURE: 97.5 F | OXYGEN SATURATION: 98 %

## 2023-05-15 DIAGNOSIS — L71.9 ROSACEA: ICD-10-CM

## 2023-05-15 DIAGNOSIS — I10 HYPERTENSION, UNCONTROLLED: Primary | ICD-10-CM

## 2023-05-15 RX ORDER — AMLODIPINE BESYLATE 10 MG/1
10 TABLET ORAL
Qty: 90 TABLET | Refills: 1 | Status: SHIPPED | OUTPATIENT
Start: 2023-05-15

## 2023-05-15 NOTE — PROGRESS NOTES
"Assessment/Plan:           Problem List Items Addressed This Visit    None  Visit Diagnoses     Hypertension, uncontrolled    -  Primary    Relevant Medications    amLODIPine (NORVASC) 10 mg tablet    Rosacea                Subjective:      Patient ID: Kenneth Jeffers is a 78 y o  female  HPI  Patient is here to follow-up on hypertension  She has been noticing her blood pressure running around 170 for the last several days  This greatly concerned her prompting her to go to the urgent care last night  She was sent home with advised to follow-up today  Her blood pressure is on the higher side  She is on amlodipine 5 mg along with metoprolol 50 and 25 and Avapro 150  Patient's been compliant with the medication  She lives in [facility]  She does admit that they have been eating spicy food I encouraged her to consume a low salt` diet  She also has been noticing her rosacea flaring up for which I encouraged her to [facility]  I am going to increase her amlodipine to 10 mg  Patient will monitor her blood pressure at home and would follow-up in 8 weeks for blood pressure check with my nurse  The following portions of the patient's history were reviewed and updated as appropriate: allergies, current medications, past medical history, past social history, past surgical history and problem list     Review of Systems      Objective:      BP (!) 172/76   Pulse 68   Temp 97 5 °F (36 4 °C)   Resp 18   Ht 5' 2\" (1 575 m)   Wt 79 8 kg (176 lb)   SpO2 98%   BMI 32 19 kg/m²          Physical Exam  Cardiovascular:      Rate and Rhythm: Normal rate and regular rhythm  Pulses: Normal pulses  Skin:     Findings: Rash (Rosacea rash) present  Neurological:      General: No focal deficit present  Mental Status: She is alert and oriented to person, place, and time                   "

## 2023-05-23 ENCOUNTER — CLINICAL SUPPORT (OUTPATIENT)
Dept: FAMILY MEDICINE CLINIC | Facility: CLINIC | Age: 83
End: 2023-05-23

## 2023-05-23 ENCOUNTER — TELEPHONE (OUTPATIENT)
Dept: FAMILY MEDICINE CLINIC | Facility: CLINIC | Age: 83
End: 2023-05-23

## 2023-05-23 DIAGNOSIS — Z01.30 BLOOD PRESSURE CHECK: Primary | ICD-10-CM

## 2023-05-23 DIAGNOSIS — I51.89 DIASTOLIC DYSFUNCTION: Primary | ICD-10-CM

## 2023-05-23 RX ORDER — FUROSEMIDE 20 MG/1
TABLET ORAL
Qty: 30 TABLET | Refills: 1 | Status: SHIPPED | OUTPATIENT
Start: 2023-05-23 | End: 2023-05-23 | Stop reason: SDUPTHER

## 2023-05-23 RX ORDER — FUROSEMIDE 20 MG/1
TABLET ORAL
Qty: 30 TABLET | Refills: 1 | Status: SHIPPED | OUTPATIENT
Start: 2023-05-23 | End: 2023-06-21 | Stop reason: SDUPTHER

## 2023-05-23 NOTE — TELEPHONE ENCOUNTER
Patient was seen today for a blood pressure check  Her blood pressure was reading 158/62  What should patients next step be?

## 2023-06-08 ENCOUNTER — OFFICE VISIT (OUTPATIENT)
Dept: FAMILY MEDICINE CLINIC | Facility: CLINIC | Age: 83
End: 2023-06-08

## 2023-06-08 VITALS — SYSTOLIC BLOOD PRESSURE: 140 MMHG | DIASTOLIC BLOOD PRESSURE: 60 MMHG

## 2023-06-08 DIAGNOSIS — Z01.30 BLOOD PRESSURE CHECK: Primary | ICD-10-CM

## 2023-06-14 ENCOUNTER — RA CDI HCC (OUTPATIENT)
Dept: OTHER | Facility: HOSPITAL | Age: 83
End: 2023-06-14

## 2023-06-14 NOTE — PROGRESS NOTES
I11 0  [facility]  coding opportunities          Chart Reviewed number of suggestions sent to Provider: 1     Patients Insurance     Medicare Insurance: [employer]

## 2023-06-21 ENCOUNTER — OFFICE VISIT (OUTPATIENT)
Dept: FAMILY MEDICINE CLINIC | Facility: CLINIC | Age: 83
End: 2023-06-21
Payer: MEDICARE

## 2023-06-21 VITALS
RESPIRATION RATE: 16 BRPM | HEART RATE: 64 BPM | TEMPERATURE: 97.8 F | OXYGEN SATURATION: 98 % | DIASTOLIC BLOOD PRESSURE: 60 MMHG | BODY MASS INDEX: 32.87 KG/M2 | HEIGHT: 62 IN | SYSTOLIC BLOOD PRESSURE: 140 MMHG | WEIGHT: 178.6 LBS

## 2023-06-21 DIAGNOSIS — I10 ESSENTIAL HYPERTENSION: Primary | ICD-10-CM

## 2023-06-21 DIAGNOSIS — I51.89 DIASTOLIC DYSFUNCTION: ICD-10-CM

## 2023-06-21 PROCEDURE — 99213 OFFICE O/P EST LOW 20 MIN: CPT | Performed by: INTERNAL MEDICINE

## 2023-06-21 RX ORDER — FUROSEMIDE 20 MG/1
TABLET ORAL
Qty: 30 TABLET | Refills: 1 | Status: SHIPPED | OUTPATIENT
Start: 2023-06-21

## 2023-06-21 NOTE — PROGRESS NOTES
"Assessment/Plan:           Problem List Items Addressed This Visit        Other    Diastolic dysfunction    Relevant Medications    furosemide (LASIX) 20 mg tablet   Other Visit Diagnoses     hypertension    -  Primary    Relevant Medications    furosemide (LASIX) 20 mg tablet        Blood pressure is much better continue current regimen I will see her back in a month for Medicare visit  Subjective:      Patient ID: Charlie Woodward is a 78 y o  female  HPI  Patient is here to follow-up on hypertension  I had recently increased her amlodipine to 10 mg a day  Please see my last note for detail  She is on metoprolol 25 mg 2 pills in the morning and 1 pill in the evening  Heart rate 64  She is also on Avapro 150 mg a day  She takes Lasix 20 mg a day  Patient is status post TAVR and follows [facility] cardiology  The following portions of the patient's history were reviewed and updated as appropriate: allergies, current medications, past medical history, past social history, past surgical history and problem list     Review of Systems      Objective:      /60 (BP Location: Left arm, Patient Position: Sitting, Cuff Size: Large)   Pulse 64   Temp 97 8 °F (36 6 °C) (Tympanic)   Resp 16   Ht 5' 2\" (1 575 m)   Wt 81 kg (178 lb 9 6 oz)   SpO2 98%   BMI 32 67 kg/m²          Physical Exam  Cardiovascular:      Rate and Rhythm: Normal rate and regular rhythm  Heart sounds: Murmur heard  Pulmonary:      Effort: Pulmonary effort is normal  No respiratory distress  Breath sounds: Normal breath sounds  No wheezing or rales  Musculoskeletal:      Right lower leg: No edema  Left lower leg: No edema  Neurological:      Mental Status: She is alert                   "

## 2023-07-28 ENCOUNTER — OFFICE VISIT (OUTPATIENT)
Dept: FAMILY MEDICINE CLINIC | Facility: CLINIC | Age: 83
End: 2023-07-28
Payer: MEDICARE

## 2023-07-28 ENCOUNTER — TELEPHONE (OUTPATIENT)
Dept: ADMINISTRATIVE | Facility: OTHER | Age: 83
End: 2023-07-28

## 2023-07-28 VITALS
OXYGEN SATURATION: 98 % | HEART RATE: 57 BPM | TEMPERATURE: 97.6 F | RESPIRATION RATE: 16 BRPM | BODY MASS INDEX: 32.94 KG/M2 | HEIGHT: 62 IN | DIASTOLIC BLOOD PRESSURE: 60 MMHG | WEIGHT: 179 LBS | SYSTOLIC BLOOD PRESSURE: 168 MMHG

## 2023-07-28 DIAGNOSIS — I51.89 DIASTOLIC DYSFUNCTION: ICD-10-CM

## 2023-07-28 DIAGNOSIS — Z00.00 MEDICARE ANNUAL WELLNESS VISIT, SUBSEQUENT: Primary | ICD-10-CM

## 2023-07-28 DIAGNOSIS — I10 ESSENTIAL HYPERTENSION: ICD-10-CM

## 2023-07-28 DIAGNOSIS — Z95.2 S/P AVR (AORTIC VALVE REPLACEMENT): ICD-10-CM

## 2023-07-28 DIAGNOSIS — E78.5 HYPERLIPIDEMIA, UNSPECIFIED HYPERLIPIDEMIA TYPE: ICD-10-CM

## 2023-07-28 PROCEDURE — 99214 OFFICE O/P EST MOD 30 MIN: CPT | Performed by: INTERNAL MEDICINE

## 2023-07-28 PROCEDURE — G0439 PPPS, SUBSEQ VISIT: HCPCS | Performed by: INTERNAL MEDICINE

## 2023-07-28 RX ORDER — AMOXICILLIN 500 MG/1
CAPSULE ORAL
COMMUNITY
Start: 2023-07-16

## 2023-07-28 RX ORDER — FUROSEMIDE 20 MG/1
TABLET ORAL
Qty: 30 TABLET | Refills: 1 | Status: SHIPPED | OUTPATIENT
Start: 2023-07-28

## 2023-07-28 NOTE — TELEPHONE ENCOUNTER
----- Message from Bailee Campos sent at 7/28/2023 10:59 AM EDT -----  Regarding: CARE GAP REQUEST  07/28/23 10:59 AM    Hello, our patient attached above has had DEXA Scan completed/performed. Please assist in updating the patient chart by pulling the Care Everywhere (CE) document. The date of service is 10/25/22.      Thank you,  Bailee Campos  [facility]

## 2023-07-28 NOTE — PROGRESS NOTES
Assessment and Plan:     Problem List Items Addressed This Visit        Other    Diastolic dysfunction    Relevant Medications    furosemide (LASIX) 20 mg tablet    Hyperlipidemia    S/P AVR (aortic valve replacement)   Other Visit Diagnoses     Medicare annual wellness visit, subsequent    -  Primary    hypertension        Relevant Medications    furosemide (LASIX) 20 mg tablet    Other Relevant Orders    Aldosterone    Metanephrine, Fractionated Plasma Free    Renin Direct Assay    Cortisol Level,7-9 AM Specimen    VAS renal artery complete           Preventive health issues were discussed with patient, and age appropriate screening tests were ordered as noted in patient's After Visit Summary. Personalized health advice and appropriate referrals for health education or preventive services given if needed, as noted in patient's After Visit Summary. History of Present Illness:     Patient presents for a Medicare Wellness Visit    Patient history of hypertension hyperlipidemia CAD status post CABG is here to follow-up on chronic medical problems and Medicare visit. Please see my last note for detail. Blood pressure still remains elevated. Rechecked it myself and remains high. Patient does admit that her food has a lot of salt at [facility] where she resides in independent living. I encouraged her to discuss this with her "there. She is on Lasix 20 mg twice a week which have been increased to 3 times a week. Secondary hypertensive work-up would also be started as seen above. I encouraged her to contact her cardiologist as well. She does mention having some swelling in the legs. I think is a combination of salt intake amlodipine usage and probably some chronic venous stasis. Encouraged her to keep her legs elevated. Compression stocking watch her salt intake and hopefully increasing Lasix would also be helpful.   Patient will follow-up with me in 2 weeks if she cannot be seen by cardiologist otherwise I will see her back in 3 months. Recent lab studies were also discussed triglycerides elevated glucose level slightly on the high side. Kidney function stable. 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 [facility])      Past Medical and Surgical History:     Past Medical History:   Diagnosis Date   • Aortic valve stenosis     last assessed: 8/2/16     Past Surgical History:   Procedure Laterality Date   • AORTIC VALVE REPLACEMENT     • BREAST LUMPECTOMY     • CORONARY ARTERY BYPASS GRAFT     • TUBAL LIGATION        Family History:     Family History   Problem Relation Age of Onset   • Hypertension Mother    • Lung cancer Father       Social History:     Social History     Socioeconomic History   • Marital status:       Spouse name: None   • Number of children: None   • Years of education: None   • Highest education level: None   Occupational History   • None   Tobacco Use   • Smoking status: Never   • Smokeless tobacco: Never   • Tobacco comments:     [employer] also states unknown if ever smoked   Vaping Use   • Vaping Use: Never used   Substance and Sexual Activity   • Alcohol use: No   • Drug use: No   • Sexual activity: Not Currently   Other Topics Concern   • None   Social History Narrative    Does not consume caffeine     Social Determinants of Health Financial Resource Strain: Low Risk  (7/28/2023)    Overall Financial Resource Strain (CARDIA)    • Difficulty of Paying Living Expenses: Not hard at all   Food Insecurity: No Food Insecurity (12/30/2019)    Hunger Vital Sign    • Worried About Running Out of Food in the Last Year: Never true    • Ran Out of Food in the Last Year: Never true   Transportation Needs: No Transportation Needs (7/28/2023)    PRAPARE - Transportation    • Lack of Transportation (Medical): No    • Lack of Transportation (Non-Medical): No   Physical Activity: Sufficiently Active (12/30/2019)    Exercise Vital Sign    • Days of Exercise per Week: 3 days    • Minutes of Exercise per Session: 60 min   Stress: No Stress Concern Present (12/30/2019)    [facility]    • Feeling of Stress : Not at all   Social Connections: Moderately Isolated (12/30/2019)    Social Connection and Isolation Panel [NHANES]    • Frequency of Communication with Friends and Family: More than three times a week    • Frequency of Social Gatherings with Friends and Family: More than three times a week    • Attends [employer]: 1 to 4 times per year    • Active Member of Clubs or Organizations: No    • Attends Club or Organization Meetings: Never    • Marital Status:     Intimate Partner Violence: Not At Risk (12/30/2019)    Humiliation, Afraid, Rape, and Kick questionnaire    • Fear of Current or Ex-Partner: No    • Emotionally Abused: No    • Physically Abused: No    • Sexually Abused: No   Housing Stability: Not on file      Medications and Allergies:     Current Outpatient Medications   Medication Sig Dispense Refill   • amLODIPine (NORVASC) 10 mg tablet Take 1 tablet (10 mg total) by mouth daily at bedtime 90 tablet 1   • amoxicillin (AMOXIL) 500 MG tablet Take 4 tablets 30-60 minutes prior to dental procedures     • aspirin 81 mg chewable tablet Chew 81 mg     • atorvastatin (LIPITOR) 40 mg tablet      • Biotin 10 MG TABS Take by mouth     • Cholecalciferol (VITAMIN D) 2000 units CAPS Take 3,000 Units by mouth     • famotidine (PEPCID) 20 mg tablet Take 1 tablet (20 mg total) by mouth daily 30 tablet 3   • furosemide (LASIX) 20 mg tablet Take 1 pill Monday, Wednesday and Friday 30 tablet 1   • irbesartan (AVAPRO) 150 mg tablet TAKE 1 TABLET AT NOON 90 tablet 3   • metoprolol tartrate (LOPRESSOR) 25 mg tablet TAKE 2 TABLETS IN THE MORNING AND 1 TABLET IN THE EVENING 270 tablet 3   • metroNIDAZOLE (METROGEL) 1 % gel      • Multiple Vitamins-Minerals (CENTRUM ADULTS PO) Take 1 tablet by mouth daily     • Multiple Vitamins-Minerals (PRESERVISION AREDS) CAPS Take 1 tablet by mouth 2 (two) times a day     • amoxicillin (AMOXIL) 500 mg capsule TAKE 4 CAPSULES 30 TO 60MINUTES PRIOR TO DENTAL PROCEDURES (Patient not taking: Reported on 7/28/2023)       No current facility-administered medications for this visit.      Allergies   Allergen Reactions   • Chocolate - Food Allergy Diarrhea   • Epinephrine Other (See Comments) and Myalgia     "makes my heart race"  "makes my heart race"  "makes my heart race"   • Latex Itching   • Lisinopril Cough   • Wound Dressing Adhesive Other (See Comments)   • Procaine Tachycardia, Other (See Comments), Palpitations and Myalgia     Other reaction(s): Tachycardia  novocaine= heart races  novocaine= heart races  novocaine= heart races   • Sulfa Antibiotics Rash   • Sulfasalazine Rash   • Wound Dressings Rash      Immunizations:     Immunization History   Administered Date(s) Administered   • COVID-19 MODERNA VACC 0.5 ML IM 01/17/2021, 02/14/2021, 11/01/2021, 04/18/2022   • COVID-19 Moderna Vac BIVALENT 12 Yr+ IM (BOOSTER ONLY) 0.5 ML 10/28/2022   • COVID-19, unspecified 11/01/2021, 04/18/2022   • INFLUENZA 10/03/2014, 09/25/2015, 10/14/2016, 10/08/2018, 09/23/2021, 09/22/2022   • Influenza Quadrivalent Preservative Free 3 years and older IM 10/14/2016   • Influenza Split High Dose Preservative Free IM 10/03/2014, 10/01/2015, 11/01/2017, 10/02/2018, 09/17/2019   • Influenza, high dose seasonal 0.7 mL 10/02/2018, 09/15/2020   • Influenza, seasonal, injectable 10/04/2000, 11/12/2001, 10/02/2002, 10/15/2003, 10/26/2004, 10/01/2007, 10/19/2007, 01/01/2009, 09/29/2011   • Pneumococcal Conjugate 13-Valent 07/16/2015   • Pneumococcal Conjugate Vaccine 20-valent (Pcv20), Polysace 01/17/2023   • Pneumococcal Polysaccharide PPV23 01/17/2003, 10/28/2010   • Tdap 11/06/2013   • Tuberculin Skin Test-PPD Intradermal 01/30/2018, 02/06/2018   • Zoster 01/01/2010   • Zoster Vaccine Recombinant 04/03/2019, 06/08/2019      Health Maintenance:         Topic Date Due   • Breast Cancer Screening: Mammogram  01/24/2021         Topic Date Due   • COVID-19 Vaccine (7 - Moderna series) 02/28/2023   • Influenza Vaccine (1) 09/01/2023      Medicare Screening Tests and Risk Assessments:     Chin Moore is here for her Subsequent Wellness visit. Health Risk Assessment:   Patient rates overall health as good. Patient feels that their physical health rating is same. Patient is satisfied with their life. Eyesight was rated as same. Hearing was rated as same. Patient feels that their emotional and mental health rating is same. Patients states they are never, rarely angry. Patient states they are never, rarely unusually tired/fatigued. Pain experienced in the last 7 days has been none. Patient states that she has experienced no weight loss or gain in last 6 months. Depression Screening:   PHQ-2 Score: 0      Fall Risk Screening: In the past year, patient has experienced: no history of falling in past year      Urinary Incontinence Screening:   Patient has not leaked urine accidently in the last six months. Home Safety:  Patient does not have trouble with stairs inside or outside of their home. Patient has working smoke alarms and has working carbon monoxide detector. Home safety hazards include: none. Nutrition:   Current diet is Regular. Medications:   Patient is currently taking over-the-counter supplements. OTC medications include: see medication list. Patient is able to manage medications. Activities of Daily Living (ADLs)/Instrumental Activities of Daily Living (IADLs):   Walk and transfer into and out of bed and chair?: Yes  Dress and groom yourself?: Yes    Bathe or shower yourself?: Yes    Feed yourself? Yes  Do your laundry/housekeeping?: Yes  Manage your money, pay your bills and track your expenses?: Yes  Make your own meals?: Yes    Do your own shopping?: Yes    Previous Hospitalizations:   Any hospitalizations or ED visits within the last 12 months?: No      Advance Care Planning:   Living will: Yes    Durable POA for healthcare: Yes    Advanced directive: Yes      Cognitive Screening:   Provider or family/friend/caregiver concerned regarding cognition?: No    PREVENTIVE SCREENINGS      Cardiovascular Screening:    General: Screening Not Indicated and History Lipid Disorder      Diabetes Screening:     General: Screening Current      Breast Cancer Screening:     General: Screening Current      Cervical Cancer Screening:    General: Screening Not Indicated      Osteoporosis Screening:    General: Screening Current      Abdominal Aortic Aneurysm (AAA) Screening:        General: Screening Not Indicated      Lung Cancer Screening:     General: Screening Not Indicated      Hepatitis C Screening:    General: Screening Not Indicated    Screening, Brief Intervention, and Referral to Treatment (SBIRT)    Screening    Typical number of drinks in a week: 0    Single Item Drug Screening:  How often have you used an illegal drug (including marijuana) or a prescription medication for non-medical reasons in the past year? never    Single Item Drug Screen Score: 0  Interpretation: Negative screen for possible drug use disorder    No results found.      Physical Exam:     /60 (BP Location: Left arm, Patient Position: Sitting, Cuff Size: Large)   Pulse 57   Temp 97.6 °F (36.4 °C) (Tympanic)   Resp 16   Ht 5' 2" (1.575 m)   Wt 81.2 kg (179 lb)   SpO2 98%   BMI 32.74 kg/m²     Physical Exam  Cardiovascular:      Rate and Rhythm: Normal rate and regular rhythm. Heart sounds: Murmur heard. Pulmonary:      Effort: Pulmonary effort is normal. No respiratory distress. Breath sounds: Normal breath sounds. No wheezing or rales. Abdominal:      General: There is no distension. Tenderness: There is no abdominal tenderness. Musculoskeletal:      Right lower leg: Edema present. Left lower leg: Edema present. Neurological:      Mental Status: She is alert and oriented to person, place, and time. Cranial Nerves: No cranial nerve deficit. Motor: No weakness.    Psychiatric:         Mood and Affect: Mood normal.         Behavior: Behavior normal.          Maritza Perez MD

## 2023-07-28 NOTE — TELEPHONE ENCOUNTER
----- Message from Theresa Austin sent at 7/28/2023 10:57 AM EDT -----  Regarding: CARE GAP REQUEST  07/28/23 10:57 AM    Hello, our patient attached above has had Mammogram completed/performed. Please assist in updating the patient chart by pulling the document from the Media Tab. The date of service is 2/7/23.      Thank you,  Theresa Austin  [facility]

## 2023-08-03 ENCOUNTER — RA CDI HCC (OUTPATIENT)
Dept: OTHER | Facility: HOSPITAL | Age: 83
End: 2023-08-03

## 2023-08-03 NOTE — PROGRESS NOTES
I11.0  [address] coding opportunities          Chart Reviewed number of suggestions sent to Provider: 1     Patients Insurance     Medicare Insurance: [employer]

## 2023-08-11 ENCOUNTER — OFFICE VISIT (OUTPATIENT)
Dept: FAMILY MEDICINE CLINIC | Facility: CLINIC | Age: 83
End: 2023-08-11
Payer: MEDICARE

## 2023-08-11 VITALS
SYSTOLIC BLOOD PRESSURE: 130 MMHG | HEIGHT: 62 IN | WEIGHT: 178 LBS | HEART RATE: 62 BPM | BODY MASS INDEX: 32.76 KG/M2 | TEMPERATURE: 97.5 F | DIASTOLIC BLOOD PRESSURE: 70 MMHG | OXYGEN SATURATION: 97 %

## 2023-08-11 DIAGNOSIS — I10 ESSENTIAL HYPERTENSION: Primary | ICD-10-CM

## 2023-08-11 DIAGNOSIS — Z95.2 S/P AVR (AORTIC VALVE REPLACEMENT): ICD-10-CM

## 2023-08-11 PROCEDURE — 99213 OFFICE O/P EST LOW 20 MIN: CPT | Performed by: INTERNAL MEDICINE

## 2023-08-11 RX ORDER — IRBESARTAN 300 MG/1
300 TABLET ORAL
COMMUNITY

## 2023-08-11 NOTE — PROGRESS NOTES
Assessment/Plan:           Problem List Items Addressed This Visit        Other    S/P AVR (aortic valve replacement)   Other Visit Diagnoses     hypertension    -  Primary    Relevant Medications    irbesartan (AVAPRO) 300 mg tablet        Continue current regimen I will see her back in November     Subjective:      Patient ID: Lindsey Ocasio is a 78 y.o. female. HPI  Patient is here to follow-up on hypertension. Her blood pressure is excellent today. I rechecked the blood pressure myself and remains excellent. Patient will be seeing her cardiologist in 2 weeks and will be getting a blood work done prior to her visit. Her cardiologist increased her Avapro 1 150 to 300 mg. She is doing quite well. Denies any chest pain shortness of breath lightheadedness palpitation. Lab studies so far have been unremarkable. She will be gettin renal arterial Doppler have been negative soon as well. The following portions of the patient's history were reviewed and updated as appropriate: allergies, current medications, past medical history, past social history, past surgical history and problem list.    Review of Systems      Objective:      /70   Pulse 62   Temp 97.5 °F (36.4 °C)   Ht 5' 2" (1.575 m)   Wt 80.7 kg (178 lb)   SpO2 97%   BMI 32.56 kg/m²          Physical Exam  Cardiovascular:      Rate and Rhythm: Normal rate and regular rhythm. Heart sounds: No murmur heard. Pulmonary:      Effort: Pulmonary effort is normal. No respiratory distress. Musculoskeletal:      Right lower leg: No edema. Left lower leg: No edema. Neurological:      Mental Status: She is alert.

## 2023-08-29 ENCOUNTER — TELEPHONE (OUTPATIENT)
Dept: FAMILY MEDICINE CLINIC | Facility: CLINIC | Age: 83
End: 2023-08-29

## 2023-08-29 NOTE — TELEPHONE ENCOUNTER
----- Message from Jim Zaragoza MD sent at 8/29/2023  3:11 PM EDT -----  Renal arterial Doppler is good. Small cyst in the lower part of left kidney.   No concerns

## 2023-10-24 DIAGNOSIS — I10 HYPERTENSION, UNCONTROLLED: ICD-10-CM

## 2023-10-24 RX ORDER — AMLODIPINE BESYLATE 10 MG/1
10 TABLET ORAL
Qty: 90 TABLET | Refills: 3 | Status: SHIPPED | OUTPATIENT
Start: 2023-10-24

## 2023-11-14 ENCOUNTER — TELEPHONE (OUTPATIENT)
Dept: FAMILY MEDICINE CLINIC | Facility: CLINIC | Age: 83
End: 2023-11-14

## 2023-11-15 ENCOUNTER — OFFICE VISIT (OUTPATIENT)
Dept: FAMILY MEDICINE CLINIC | Facility: CLINIC | Age: 83
End: 2023-11-15
Payer: MEDICARE

## 2023-11-15 VITALS
TEMPERATURE: 97.7 F | BODY MASS INDEX: 32.24 KG/M2 | HEART RATE: 60 BPM | DIASTOLIC BLOOD PRESSURE: 58 MMHG | HEIGHT: 62 IN | SYSTOLIC BLOOD PRESSURE: 130 MMHG | OXYGEN SATURATION: 100 % | WEIGHT: 175.2 LBS

## 2023-11-15 DIAGNOSIS — E04.2 NONTOXIC MULTINODULAR GOITER: ICD-10-CM

## 2023-11-15 DIAGNOSIS — Z23 IMMUNIZATION DUE: ICD-10-CM

## 2023-11-15 DIAGNOSIS — L71.9 ROSACEA: ICD-10-CM

## 2023-11-15 DIAGNOSIS — I10 HYPERTENSION, UNCONTROLLED: Primary | ICD-10-CM

## 2023-11-15 DIAGNOSIS — Z87.74 HISTORY OF BICUSPID AORTIC VALVE: ICD-10-CM

## 2023-11-15 DIAGNOSIS — Z95.2 S/P AVR (AORTIC VALVE REPLACEMENT): ICD-10-CM

## 2023-11-15 DIAGNOSIS — I10 ESSENTIAL HYPERTENSION: ICD-10-CM

## 2023-11-15 DIAGNOSIS — Z12.31 ENCOUNTER FOR SCREENING MAMMOGRAM FOR BREAST CANCER: Primary | ICD-10-CM

## 2023-11-15 DIAGNOSIS — Z23 NEED FOR VACCINATION: ICD-10-CM

## 2023-11-15 DIAGNOSIS — E78.5 HYPERLIPIDEMIA, UNSPECIFIED HYPERLIPIDEMIA TYPE: ICD-10-CM

## 2023-11-15 PROCEDURE — 99214 OFFICE O/P EST MOD 30 MIN: CPT | Performed by: INTERNAL MEDICINE

## 2023-11-15 RX ORDER — HYDROCHLOROTHIAZIDE 25 MG/1
25 TABLET ORAL DAILY
COMMUNITY
Start: 2023-08-28 | End: 2024-08-27

## 2023-11-15 NOTE — PROGRESS NOTES
Assessment/Plan:    She is tolerating statins well. Problem List Items Addressed This Visit       Hyperlipidemia    Relevant Orders    Lipid panel    Nontoxic multinodular goiter    Relevant Orders    TSH, 3rd generation with Free T4 reflex    CBC and differential    S/P AVR (aortic valve replacement)     Other Visit Diagnoses       Hypertension, uncontrolled    -  Primary    Relevant Medications    hydrochlorothiazide (HYDRODIURIL) 25 mg tablet    Other Relevant Orders    Comprehensive metabolic panel    hypertension        Relevant Medications    hydrochlorothiazide (HYDRODIURIL) 25 mg tablet    History of bicuspid aortic valve        Rosacea        Immunization due        Need for vaccination                  Subjective:      Patient ID: Sivakumar Ryan is a 78 y.o. female. HPI  Patient history of hypertension status post TAVR and hyperlipidemia is here to follow-up on chronic medical problems. Blood pressure is excellent today. Patient reports no chest pain shortness of breath lightheadedness palpitation. In fact her lower extreme edema has resolved as well. She is on HCTZ in place of Lasix now. We will do blood work for next visit. Recent endocrinology consultation was reviewed for nontoxic multinodular goiter. Thyroid ultrasound will be repeated in a year  DEXA scan last year showed osteopenia. She had been on Reclast in the past.  Her endocrinologist plans to repeat DEXA scan next year. Patient is due for mammogram.  She had COVID and flu vaccination. She question about RSV vaccination which I would strongly encourage. Patient reports no depression. She is happily living in [address] forward to visiting her sister was dealing with dementia.   The following portions of the patient's history were reviewed and updated as appropriate: allergies, current medications, past medical history, past social history, past surgical history, and problem list.    Review of Systems Objective:      /58 (BP Location: Left arm, Patient Position: Sitting, Cuff Size: Large)   Pulse 60   Temp 97.7 °F (36.5 °C) (Tympanic)   Ht 5' 2" (1.575 m)   Wt 79.5 kg (175 lb 3.2 oz)   SpO2 100%   BMI 32.04 kg/m²          Physical Exam  Cardiovascular:      Rate and Rhythm: Normal rate and regular rhythm. Heart sounds: Murmur heard. Pulmonary:      Effort: Pulmonary effort is normal. No respiratory distress. Breath sounds: Normal breath sounds. No wheezing or rales. Musculoskeletal:      Right lower leg: No edema. Left lower leg: No edema. Neurological:      Mental Status: She is alert.

## 2024-03-08 ENCOUNTER — RA CDI HCC (OUTPATIENT)
Dept: OTHER | Facility: HOSPITAL | Age: 84
End: 2024-03-08

## 2024-03-26 ENCOUNTER — OFFICE VISIT (OUTPATIENT)
Dept: FAMILY MEDICINE CLINIC | Facility: CLINIC | Age: 84
End: 2024-03-26
Payer: MEDICARE

## 2024-03-26 VITALS
HEART RATE: 58 BPM | OXYGEN SATURATION: 98 % | RESPIRATION RATE: 16 BRPM | SYSTOLIC BLOOD PRESSURE: 130 MMHG | BODY MASS INDEX: 30.69 KG/M2 | DIASTOLIC BLOOD PRESSURE: 58 MMHG | HEIGHT: 62 IN | WEIGHT: 166.8 LBS | TEMPERATURE: 97.5 F

## 2024-03-26 DIAGNOSIS — M25.561 ACUTE PAIN OF RIGHT KNEE: Primary | ICD-10-CM

## 2024-03-26 DIAGNOSIS — Z13.29 SCREENING FOR THYROID DISORDER: ICD-10-CM

## 2024-03-26 DIAGNOSIS — I38: ICD-10-CM

## 2024-03-26 DIAGNOSIS — I10 HYPERTENSION, UNCONTROLLED: ICD-10-CM

## 2024-03-26 DIAGNOSIS — E55.9 VITAMIN D DEFICIENCY: ICD-10-CM

## 2024-03-26 DIAGNOSIS — I25.118 CORONARY ARTERY DISEASE OF NATIVE ARTERY OF NATIVE HEART WITH STABLE ANGINA PECTORIS (HCC): ICD-10-CM

## 2024-03-26 DIAGNOSIS — I50.42: ICD-10-CM

## 2024-03-26 DIAGNOSIS — E78.5 HYPERLIPIDEMIA, UNSPECIFIED HYPERLIPIDEMIA TYPE: ICD-10-CM

## 2024-03-26 DIAGNOSIS — Z95.2 S/P AVR (AORTIC VALVE REPLACEMENT): ICD-10-CM

## 2024-03-26 PROCEDURE — 99213 OFFICE O/P EST LOW 20 MIN: CPT | Performed by: INTERNAL MEDICINE

## 2024-03-26 PROCEDURE — G2211 COMPLEX E/M VISIT ADD ON: HCPCS | Performed by: INTERNAL MEDICINE

## 2024-03-26 NOTE — PROGRESS NOTES
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 female.    HPI  Patient is here to follow-up recent [facility] station with acute right knee discomfort  while she was taking a yoga class.  She was stretching all of a sudden heard a snap and had a sharp onset of pain.  She went to the hospital and had an x-ray which was negative for dislocation or fracture.  Mild medial compartment joint space narrowing and spurring of the patella was noted.  An ultrasound was ordered to make sure there was no ruptured popliteal cyst which was also unremarkable.  Patient was hospitalized because of her inability with the pain and did quite well.  She was discharged on a walker and has now graduated to a cane wearing a brace.  She has been minimal discomfort.  Physical therapy has started.  She reports minimal discomfort and no locking up sensation.  She will be able to return to driving.  I advised her to be cautious and not to restart her yoga classes until she is done with physical therapy.  She is following with [facility] cardiology and has no concerns.  Blood pressure is much better.  Last echocardiogram showed good ejection fraction.  Prosthetic valve is sitting well.  She reports no chest pain  "shortness of breath.  She will be due for lab studies soon before next visit.       The following portions of the patient's history were reviewed and updated as appropriate: allergies, current medications, past family history, past medical history, past social history, past surgical history, and problem list.    Review of Systems      Objective:      /58 (BP Location: Left arm, Patient Position: Sitting, Cuff Size: Large)   Pulse 58   Temp 97.5 °F (36.4 °C) (Tympanic)   Resp 16   Ht 5' 2\" (1.575 m)   Wt 75.7 kg (166 lb 12.8 oz)   SpO2 98%   BMI 30.51 kg/m²          Physical Exam  Musculoskeletal:      Comments: Negative tenderness of the right knee joint.  Good range of motion.  Negative edema.     Neurological:      Mental Status: She is alert.      Gait: Gait normal.      Comments: Cane assisted ambulation                 " Body Location Override (Optional - Billing Will Still Be Based On Selected Body Map Location If Applicable): right mandibular jaw

## 2024-04-10 ENCOUNTER — TELEPHONE (OUTPATIENT)
Age: 84
End: 2024-04-10

## 2024-04-10 NOTE — TELEPHONE ENCOUNTER
Zeeshan from [facility] called to follow up on physician order forms faxed over on 3/28. Was unable to confirm if forms were received, confirmed fax number with him and he will refax forms.

## 2024-05-06 DIAGNOSIS — I10 ESSENTIAL HYPERTENSION: ICD-10-CM

## 2024-07-22 LAB
25(OH)D3+25(OH)D2 SERPL-MCNC: 50 NG/ML (ref 30–100)
ALBUMIN SERPL-MCNC: 4.1 G/DL (ref 3.5–5.7)
ALP SERPL-CCNC: 79 U/L (ref 35–120)
ALT SERPL-CCNC: 20 U/L
ANION GAP SERPL CALCULATED.3IONS-SCNC: 12 MMOL/L (ref 3–11)
AST SERPL-CCNC: 22 U/L
BACTERIA URNS QL MICRO: ABNORMAL
BASOPHILS # BLD AUTO: 0 THOU/CMM (ref 0–0.1)
BASOPHILS NFR BLD AUTO: 1 %
BILIRUB SERPL-MCNC: 0.5 MG/DL (ref 0.2–1)
BUN SERPL-MCNC: 37 MG/DL (ref 7–25)
CALCIUM SERPL-MCNC: 9.3 MG/DL (ref 8.5–10.1)
CHLORIDE SERPL-SCNC: 104 MMOL/L (ref 100–109)
CHOLEST SERPL-MCNC: 139 MG/DL
CHOLEST/HDLC SERPL: 2.8 {RATIO}
CO2 SERPL-SCNC: 24 MMOL/L (ref 21–31)
CREAT SERPL-MCNC: 0.83 MG/DL (ref 0.4–1.1)
CYTOLOGY CMNT CVX/VAG CYTO-IMP: ABNORMAL
DIFFERENTIAL METHOD BLD: ABNORMAL
EOSINOPHIL # BLD AUTO: 0.2 THOU/CMM (ref 0–0.5)
EOSINOPHIL NFR BLD AUTO: 3 %
ERYTHROCYTE [DISTWIDTH] IN BLOOD BY AUTOMATED COUNT: 13.4 % (ref 12–16)
GFR/BSA.PRED SERPLBLD CYS-BASED-ARV: 69 ML/MIN/{1.73_M2}
GLUCOSE SERPL-MCNC: 99 MG/DL (ref 65–99)
GLUCOSE UR QL STRIP: NEGATIVE MG/DL
HCT VFR BLD AUTO: 35.2 % (ref 35–43)
HDLC SERPL-MCNC: 49 MG/DL (ref 23–92)
HGB BLD-MCNC: 11.7 G/DL (ref 11.5–14.5)
HGB UR QL STRIP: NEGATIVE MG/DL
KETONES UR QL STRIP: NEGATIVE MG/DL
LDLC SERPL CALC-MCNC: 66 MG/DL
LEUKOCYTE ESTERASE UR QL STRIP: 500 /UL
LYMPHOCYTES # BLD AUTO: 0.8 THOU/CMM (ref 1–3)
LYMPHOCYTES NFR BLD AUTO: 13 %
MCH RBC QN AUTO: 29.6 PG (ref 26–34)
MCHC RBC AUTO-ENTMCNC: 33.2 G/DL (ref 32–37)
MCV RBC AUTO: 89 FL (ref 80–100)
MONOCYTES # BLD AUTO: 0.8 THOU/CMM (ref 0.3–1)
MONOCYTES NFR BLD AUTO: 12 %
MUCOUS THREADS URNS QL MICRO: ABNORMAL
NEUTROPHILS # BLD AUTO: 4.7 THOU/CMM (ref 1.8–7.8)
NEUTROPHILS NFR BLD AUTO: 71 %
NITRITE UR QL STRIP: POSITIVE
NONHDLC SERPL-MCNC: 90 MG/DL
PH UR: 5 [PH] (ref 4.5–8)
PLATELET # BLD AUTO: 192 THOU/CMM (ref 140–350)
PMV BLD REES-ECKER: 8.9 FL (ref 7.5–11.3)
POTASSIUM SERPL-SCNC: 4.4 MMOL/L (ref 3.5–5.2)
PROT 24H UR-MRATE: NEGATIVE MG/DL
PROT SERPL-MCNC: 6.7 G/DL (ref 6.3–8.3)
RBC # BLD AUTO: 3.94 MILL/CMM (ref 3.7–4.7)
RBC #/AREA URNS HPF: ABNORMAL /HPF (ref 0–2)
SL AMB POCT URINE COMMENT: ABNORMAL
SODIUM SERPL-SCNC: 140 MMOL/L (ref 135–145)
SP GR UR: 1.02 (ref 1–1.03)
SQUAMOUS #/AREA URNS HPF: >10 /LPF (ref 0–5)
TRANS CELLS #/AREA URNS HPF: >10 /LPF (ref 0–1)
TRIGL SERPL-MCNC: 119 MG/DL
TSH SERPL-ACNC: 2.61 UIU/ML (ref 0.45–5.33)
WBC # BLD AUTO: 6.5 THOU/CMM (ref 4–10)
WBC #/AREA URNS HPF: ABNORMAL /HPF (ref 0–5)

## 2024-07-30 ENCOUNTER — OFFICE VISIT (OUTPATIENT)
Dept: FAMILY MEDICINE CLINIC | Facility: CLINIC | Age: 84
End: 2024-07-30
Payer: MEDICARE

## 2024-07-30 VITALS
RESPIRATION RATE: 16 BRPM | HEART RATE: 59 BPM | SYSTOLIC BLOOD PRESSURE: 138 MMHG | HEIGHT: 62 IN | WEIGHT: 165.6 LBS | TEMPERATURE: 96.9 F | DIASTOLIC BLOOD PRESSURE: 58 MMHG | OXYGEN SATURATION: 99 % | BODY MASS INDEX: 30.47 KG/M2

## 2024-07-30 DIAGNOSIS — Z87.74 HISTORY OF BICUSPID AORTIC VALVE: ICD-10-CM

## 2024-07-30 DIAGNOSIS — I25.118 CORONARY ARTERY DISEASE OF NATIVE ARTERY OF NATIVE HEART WITH STABLE ANGINA PECTORIS (HCC): ICD-10-CM

## 2024-07-30 DIAGNOSIS — Z95.1 S/P CABG X 1: ICD-10-CM

## 2024-07-30 DIAGNOSIS — M17.10 ARTHRITIS OF KNEE: ICD-10-CM

## 2024-07-30 DIAGNOSIS — I10 ESSENTIAL HYPERTENSION: Primary | ICD-10-CM

## 2024-07-30 DIAGNOSIS — E78.5 HYPERLIPIDEMIA, UNSPECIFIED HYPERLIPIDEMIA TYPE: ICD-10-CM

## 2024-07-30 DIAGNOSIS — Z00.00 MEDICARE ANNUAL WELLNESS VISIT, SUBSEQUENT: ICD-10-CM

## 2024-07-30 DIAGNOSIS — Z95.2 S/P AVR (AORTIC VALVE REPLACEMENT): ICD-10-CM

## 2024-07-30 PROCEDURE — 99213 OFFICE O/P EST LOW 20 MIN: CPT | Performed by: INTERNAL MEDICINE

## 2024-07-30 PROCEDURE — G0439 PPPS, SUBSEQ VISIT: HCPCS | Performed by: INTERNAL MEDICINE

## 2024-07-30 RX ORDER — SPIRONOLACTONE 25 MG/1
25 TABLET ORAL DAILY
COMMUNITY
Start: 2023-12-19 | End: 2024-12-18

## 2024-07-30 NOTE — PROGRESS NOTES
Ambulatory Visit  Name: Adwoa Ayoub      : 1940      MRN: [id number]  Encounter Provider: Amber Murphy MD  Encounter Date: 2024   Encounter department: [facility]    Assessment & Plan   1. hypertension  2. Hyperlipidemia, unspecified hyperlipidemia type  3. S/P AVR (aortic valve replacement)  4. Coronary artery disease of native artery of native heart with stable angina pectoris (HCC)  5. S/P CABG x 1  6. History of bicuspid aortic valve  7. Medicare annual wellness visit, subsequent  8. Arthritis of knee       Preventive health issues were discussed with patient, and age appropriate screening tests were ordered as noted in patient's After Visit Summary. Personalized health advice and appropriate referrals for health education or preventive services given if needed, as noted in patient's After Visit Summary.    History of Present Illness     Serious Illness Conversation    1. What is your understanding now of where you are with your illness?  Prognostic Understanding: appropriate understanding of prognosis     2. How much information about what is likely to be ahead with your illness would you like to have?  Information: patient wants to be fully informed     3. What did you (clinician) communicate to the patient?  Prognostic Communication: Uncertain - It can be difficult to predict what will happen with your illness. I hope you will continue to live well for a long time but I’m worried that you could get sick quickly, and I think it is important to prepare for that possibility.     4. If your health situation worsens, what are your most important goals?  Goals: be physically comfortable, be spiritually and emotionally at peace, not be a burden     5. What are the biggest fears and worries about the future and your health?  Fears/Worries: being a physical burden, getting treatments I do not want     6. What abilities are so critical to your life that you cannot  imagine living without them?  Unacceptable Function: being in pain or very uncomfortable, not being able to care for myself, including toileting and feeding     7. What gives you strength as you think about the future with your illness?     8. If you become sicker, how much are you willing to go through for the possibility of gaining more time?  Be in the hospital: Yes Have a feeding tube: No   Be in the ICU: Yes Live in a nursing home: Yes   Be on a ventilator: No Be uncomfortable: No   Be on dialysis: No Undergo aggressive test and/or procedures: No   9. How much does your proxy and family know about your priorities and wishes?  Discussion Discussion: wants clinician to talk with family        How does this plan sound to you? I will do everything I can to help you through this.  Patient verbalized understanding of the plan     I have spent 20 minutes speaking with my patient on advanced care planning today or during this visit     Advanced directives  Five Wishes: Patient has Five WIshes in chart     Patient with hypertension hyperlipidemia coronary disease status post CABG history of bicuspid arctic valve status post TAVR doing very well in assisted living at [facility].  She is here today to follow-up on chronic medical problems.  Blood pressure is good denies any chest pain shortness spondylitis palpitation.  She shares episode of bilateral knee discomfort please see my last note for detail.  She is doing much better now and will be starting with her yoga classes soon.  Lab studies reviewed.  No no concerns.  Will do blood work before next visit in 6 months.  Her advanced directives were reviewed.   HPI   Patient Care Team:  Amber Murphy MD as PCP - General  MD Amber Gray MD Charles Andrews, MD    Review of Systems  Medical History Reviewed by provider this encounter:       Annual Wellness Visit Questionnaire   Adwoa is here for her Subsequent Wellness visit.     Health Risk Assessment:    Patient rates overall health as very good. Patient feels that their physical health rating is same. Patient is very satisfied with their life. Eyesight was rated as same. Hearing was rated as same. Patient feels that their emotional and mental health rating is same. Patients states they are sometimes angry. Patient states they are never, rarely unusually tired/fatigued. Pain experienced in the last 7 days has been none. Patient states that she has experienced no weight loss or gain in last 6 months.     Depression Screening:   PHQ-2 Score: 0      Fall Risk Screening:   In the past year, patient has experienced: no history of falling in past year      Urinary Incontinence Screening:   Patient has not leaked urine accidently in the last six months.     Home Safety:  Patient does not have trouble with stairs inside or outside of their home. Patient has working smoke alarms and has working carbon monoxide detector. Home safety hazards include: none.     Nutrition:   Current diet is Regular.     Medications:   Patient is currently taking over-the-counter supplements. OTC medications include: see medication list. Patient is able to manage medications.     Activities of Daily Living (ADLs)/Instrumental Activities of Daily Living (IADLs):   Walk and transfer into and out of bed and chair?: Yes  Dress and groom yourself?: Yes    Bathe or shower yourself?: Yes    Feed yourself? Yes  Do your laundry/housekeeping?: Yes  Manage your money, pay your bills and track your expenses?: Yes  Make your own meals?: Yes    Do your own shopping?: Yes    Previous Hospitalizations:   Any hospitalizations or ED visits within the last 12 months?: Yes    How many hospitalizations have you had in the last year?: 1-2    Advance Care Planning:   Living will: Yes    Durable POA for healthcare: Yes    Advanced directive: Yes      PREVENTIVE SCREENINGS      Cardiovascular Screening:    General: Screening Not Indicated and History Lipid Disorder      " Diabetes Screening:     General: Screening Current      Colorectal Cancer Screening:     General: Screening Not Indicated      Breast Cancer Screening:     General: Screening Current      Cervical Cancer Screening:    General: Screening Not Indicated      Lung Cancer Screening:     General: Screening Not Indicated    Screening, Brief Intervention, and Referral to Treatment (SBIRT)    Screening  Typical number of drinks in a day: 0  Typical number of drinks in a week: 0  Interpretation: Low risk drinking behavior.    Social Determinants of Health     Financial Resource Strain: Low Risk  (3/8/2024)    [facility]    Overall Financial Resource Strain (CARDIA)     Difficulty of Paying Living Expenses: Not very hard   Food Insecurity: No Food Insecurity (3/8/2024)    [facility]    Hunger Vital Sign     Worried About Running Out of Food in the Last Year: Never true     Ran Out of Food in the Last Year: Never true   Transportation Needs: No Transportation Needs (3/8/2024)    [facility]    PRAPARE - Transportation     Lack of Transportation (Medical): No     Lack of Transportation (Non-Medical): No   Housing Stability: Low Risk  (3/8/2024)    [facility] from [facility]    Housing Stability Vital Sign     Unable to Pay for Housing in the Last Year: No     Number of Places Lived in the Last Year: 1     Unstable Housing in the Last Year: No   Utilities: Not At Risk (3/8/2024)    Received from [facility], [facility] Utilities     Threatened with loss of utilities: No     No results found.    Objective     /58 (BP Location: Left arm, Patient Position: Sitting, Cuff Size: Large)   Pulse 59   Temp (!) 96.9 °F (36.1 °C) (Tympanic)   Resp 16   Ht 5' 2\" " (1.575 m)   Wt 75.1 kg (165 lb 9.6 oz)   SpO2 99%   BMI 30.29 kg/m²     Physical Exam  Cardiovascular:      Rate and Rhythm: Normal rate and regular rhythm.      Heart sounds: Normal heart sounds. No murmur heard.  Pulmonary:      Effort: Pulmonary effort is normal. No respiratory distress.      Breath sounds: Normal breath sounds. No wheezing or rales.   Musculoskeletal:      Right lower leg: No edema.      Left lower leg: No edema.   Neurological:      Mental Status: She is alert and oriented to person, place, and time.      Motor: No weakness.   Psychiatric:         Mood and Affect: Mood normal.         Behavior: Behavior normal.       Administrative Statements   I have spent a total time of 30 minutes in caring for this patient on the day of the visit/encounter including Diagnostic results, Prognosis, Risks and benefits of tx options, Instructions for management, Patient and family education, Importance of tx compliance, Risk factor reductions, Impressions, Counseling / Coordination of care, and Documenting in the medical record.

## 2024-09-24 ENCOUNTER — OFFICE VISIT (OUTPATIENT)
Age: 84
End: 2024-09-24
Payer: MEDICARE

## 2024-09-24 VITALS
DIASTOLIC BLOOD PRESSURE: 60 MMHG | WEIGHT: 165 LBS | HEART RATE: 78 BPM | HEIGHT: 62 IN | BODY MASS INDEX: 30.36 KG/M2 | SYSTOLIC BLOOD PRESSURE: 118 MMHG | TEMPERATURE: 98.7 F | OXYGEN SATURATION: 98 %

## 2024-09-24 DIAGNOSIS — R42 VERTIGO: ICD-10-CM

## 2024-09-24 DIAGNOSIS — Z87.74 HISTORY OF BICUSPID AORTIC VALVE: ICD-10-CM

## 2024-09-24 DIAGNOSIS — Z95.2 S/P AVR (AORTIC VALVE REPLACEMENT): ICD-10-CM

## 2024-09-24 DIAGNOSIS — I10 ESSENTIAL HYPERTENSION: Primary | ICD-10-CM

## 2024-09-24 PROCEDURE — G2211 COMPLEX E/M VISIT ADD ON: HCPCS | Performed by: INTERNAL MEDICINE

## 2024-09-24 PROCEDURE — 99214 OFFICE O/P EST MOD 30 MIN: CPT | Performed by: INTERNAL MEDICINE

## 2024-09-24 RX ORDER — MECLIZINE HYDROCHLORIDE 25 MG/1
25 TABLET ORAL EVERY 8 HOURS PRN
Qty: 30 TABLET | Refills: 0 | Status: SHIPPED | OUTPATIENT
Start: 2024-09-24

## 2024-09-24 NOTE — PROGRESS NOTES
"Assessment/Plan:           Problem List Items Addressed This Visit       S/P AVR (aortic valve replacement)    Relevant Orders    VAS carotid complete study     Other Visit Diagnoses       Essential hypertension    -  Primary    Relevant Orders    VAS carotid complete study    History of bicuspid aortic valve        Relevant Orders    VAS carotid complete study    Vertigo        Relevant Medications    meclizine (ANTIVERT) 25 mg tablet    Other Relevant Orders    Ambulatory Referral to Physical Therapy              Subjective:      Patient ID: Adwoa Ayoub is a 83 y.o. female.    HPI  Patient is here follow-up on recent ER visit with vertigo.  She recalls being in a reclining position for several hours when she was getting a procedure performed and when she got up began to feel dizzy.   is vertiginous.  She does not report dizziness when getting up from a sitting position.  She denies any palpitation chest pain shortness of breath.  Denies any focal weakness.  She status post TAVR secondary to bicuspid aortic valve.  Patient taking meclizine and reports much improvement.  I suggest patient would go for vestibular therapy also like to check her carotids.  Patient agrees.  Blood pressure is good.  allergies, current medications, past family history, past medical history, past social history, past surgical history, and problem list.    Review of Systems      Objective:      /60 (BP Location: Left arm, Patient Position: Sitting, Cuff Size: Standard)   Pulse 78   Temp 98.7 °F (37.1 °C) (Temporal)   Ht 5' 2\" (1.575 m)   Wt 74.8 kg (165 lb)   SpO2 98%   BMI 30.18 kg/m²          Physical Exam  Neck:      Vascular: No carotid bruit.   Cardiovascular:      Rate and Rhythm: Normal rate and regular rhythm.      Heart sounds: Normal heart sounds. No murmur heard.  Pulmonary:      Effort: Pulmonary effort is normal. No respiratory distress.      Breath sounds: No wheezing or rales.   Musculoskeletal:      Right lower " leg: No edema.      Left lower leg: No edema.   Neurological:      Mental Status: She is alert and oriented to person, place, and time.      Cranial Nerves: No cranial nerve deficit.

## 2024-10-09 ENCOUNTER — TELEPHONE (OUTPATIENT)
Age: 84
End: 2024-10-09

## 2024-10-09 DIAGNOSIS — I35.0 NONRHEUMATIC AORTIC (VALVE) STENOSIS: ICD-10-CM

## 2024-10-09 DIAGNOSIS — Z86.79 HISTORY OF CAROTID ARTERY DISEASE: Primary | ICD-10-CM

## 2024-10-09 NOTE — TELEPHONE ENCOUNTER
Patient called and was unable to get the VAS carotid complete study done.  The tech advised her that the way it was written medicare would not pay for it and she would end up with a bill for $2000.  Pls advise if we can re word the order to make sure the insurance will cover.  Pls advise

## 2024-10-10 NOTE — TELEPHONE ENCOUNTER
Called and spoke with pt informed her of diagnosis change and pt requested new order be mailed to her.. sent out in mail today

## 2024-10-18 DIAGNOSIS — I10 HYPERTENSION, UNCONTROLLED: ICD-10-CM

## 2024-10-18 RX ORDER — AMLODIPINE BESYLATE 10 MG/1
10 TABLET ORAL
Qty: 90 TABLET | Refills: 1 | Status: SHIPPED | OUTPATIENT
Start: 2024-10-18

## 2024-10-23 ENCOUNTER — TELEPHONE (OUTPATIENT)
Age: 84
End: 2024-10-23

## 2024-10-23 ENCOUNTER — TELEMEDICINE (OUTPATIENT)
Age: 84
End: 2024-10-23
Payer: MEDICARE

## 2024-10-23 DIAGNOSIS — I77.9 BILATERAL CAROTID ARTERY DISEASE, UNSPECIFIED TYPE (HCC): Primary | ICD-10-CM

## 2024-10-23 DIAGNOSIS — Z95.2 S/P AVR (AORTIC VALVE REPLACEMENT): ICD-10-CM

## 2024-10-23 DIAGNOSIS — E78.5 HYPERLIPIDEMIA, UNSPECIFIED HYPERLIPIDEMIA TYPE: ICD-10-CM

## 2024-10-23 DIAGNOSIS — Z87.74 HISTORY OF BICUSPID AORTIC VALVE: ICD-10-CM

## 2024-10-23 PROCEDURE — G2211 COMPLEX E/M VISIT ADD ON: HCPCS | Performed by: INTERNAL MEDICINE

## 2024-10-23 PROCEDURE — 99213 OFFICE O/P EST LOW 20 MIN: CPT | Performed by: INTERNAL MEDICINE

## 2024-10-23 NOTE — TELEPHONE ENCOUNTER
Patient stated she is returning phone call from Dr Murphy regarding Ultra sound for  Carotid artery. I was not able to locate any notes regarding receiving a call today from office. Warm transfer to [place].

## 2024-10-23 NOTE — PROGRESS NOTES
Virtual Regular Visit  Name: Adwoa Ayoub      : 1940      MRN: [id number]  Encounter Provider: Amber Murphy MD  Encounter Date: 10/23/2024   Encounter department: [facility]    Verification of patient location:    Patient is located at Home in the following state in which I hold an active license [place]    Assessment & Plan  Bilateral carotid artery disease, unspecified type (HCC)    Orders:    CTA neck w wo contrast; Future    S/P AVR (aortic valve replacement)         History of bicuspid aortic valve         Hyperlipidemia, unspecified hyperlipidemia type              Encounter provider Amber Murphy MD    The patient was identified by name and date of birth. Adwoa Ayoub was informed that this is a telemedicine visit and that the visit is being conducted through the [employer] Teams platform. She agrees to proceed..  My office door was closed. No one else was in the room.  She acknowledged consent and understanding of privacy and security of the video platform. The patient has agreed to participate and understands they can discontinue the visit at any time.    Patient is aware this is a billable service.     History of Present Illness     HPI  Patient is being seen through televisit to discuss recent carotid Dopplers.  Patient history of AVR secondary to bicuspid aortic valve.  Transmitted sound was audible on exam that day for which I ordered a carotid Doppler.  It shows moderate atherosclerotic plaque in bilateral arteries however possibility of elevated peak systolic velocity on the right mid ICA was noted.  Entry stenosis could not be ruled out.  A follow-up CTA was suggested.  I discussed with the patient and a CTA would be ordered.  She would like to get it done in [facility].  She will continue with her statin.    Also to note tortuosity of bilateral mid internal carotid arteries is noted.   Both vertebral arteries demonstrate antegrade flow.     History obtained  from : patient  Review of Systems  Past Medical History   Past Medical History:   Diagnosis Date    Aortic valve stenosis     last assessed: 8/2/16    Arthritis     GERD (gastroesophageal reflux disease)     Hyperlipidemia     Hypertension     Osteopenia      Past Surgical History:   Procedure Laterality Date    AORTIC VALVE REPLACEMENT      BREAST LUMPECTOMY      CORONARY ARTERY BYPASS GRAFT      TUBAL LIGATION       Family History   Problem Relation Age of Onset    Hypertension Mother     Lung cancer Father      Current Outpatient Medications on File Prior to Visit   Medication Sig Dispense Refill    amLODIPine (NORVASC) 10 mg tablet TAKE 1 TABLET DAILY AT BEDTIME 90 tablet 1    amoxicillin (AMOXIL) 500 mg capsule TAKE 4 CAPSULES 30 TO 60MINUTES PRIOR TO DENTAL PROCEDURES (Patient not taking: Reported on 7/28/2023)      amoxicillin (AMOXIL) 500 MG tablet Take 4 tablets 30-60 minutes prior to dental procedures      aspirin 81 mg chewable tablet Chew 81 mg      atorvastatin (LIPITOR) 40 mg tablet       Biotin 10 MG TABS Take by mouth      Cholecalciferol (VITAMIN D) 2000 units CAPS Take 3,000 Units by mouth (Patient not taking: Reported on 8/11/2023)      famotidine (PEPCID) 20 mg tablet Take 1 tablet (20 mg total) by mouth daily (Patient not taking: Reported on 8/11/2023) 30 tablet 3    irbesartan (AVAPRO) 300 mg tablet Take 300 mg by mouth daily at bedtime      meclizine (ANTIVERT) 25 mg tablet Take 1 tablet (25 mg total) by mouth every 8 (eight) hours as needed for dizziness 30 tablet 0    metoprolol tartrate (LOPRESSOR) 25 mg tablet TAKE 2 TABLETS IN THE MORNING AND 1 TABLET IN THE EVENING 270 tablet 1    metroNIDAZOLE (METROGEL) 1 % gel       Multiple Vitamins-Minerals (CENTRUM ADULTS PO) Take 1 tablet by mouth daily      Multiple Vitamins-Minerals (PRESERVISION AREDS) CAPS Take 1 tablet by mouth 2 (two) times a day      spironolactone (ALDACTONE) 25 mg tablet Take 25 mg by mouth daily       No current  "facility-administered medications on file prior to visit.     Allergies   Allergen Reactions    Chocolate - Food Allergy Diarrhea    Epinephrine Other (See Comments) and Myalgia     \"makes my heart race\"  \"makes my heart race\"  \"makes my heart race\"    Latex Itching    Lisinopril Cough    Wound Dressing Adhesive Other (See Comments)    Procaine Tachycardia, Other (See Comments), Palpitations and Myalgia     Other reaction(s): Tachycardia  novocaine= heart races  novocaine= heart races  novocaine= heart races    Sulfa Antibiotics Rash    Sulfasalazine Rash    Wound Dressings Rash      Current Outpatient Medications on File Prior to Visit   Medication Sig Dispense Refill    amLODIPine (NORVASC) 10 mg tablet TAKE 1 TABLET DAILY AT BEDTIME 90 tablet 1    amoxicillin (AMOXIL) 500 mg capsule TAKE 4 CAPSULES 30 TO 60MINUTES PRIOR TO DENTAL PROCEDURES (Patient not taking: Reported on 7/28/2023)      amoxicillin (AMOXIL) 500 MG tablet Take 4 tablets 30-60 minutes prior to dental procedures      aspirin 81 mg chewable tablet Chew 81 mg      atorvastatin (LIPITOR) 40 mg tablet       Biotin 10 MG TABS Take by mouth      Cholecalciferol (VITAMIN D) 2000 units CAPS Take 3,000 Units by mouth (Patient not taking: Reported on 8/11/2023)      famotidine (PEPCID) 20 mg tablet Take 1 tablet (20 mg total) by mouth daily (Patient not taking: Reported on 8/11/2023) 30 tablet 3    irbesartan (AVAPRO) 300 mg tablet Take 300 mg by mouth daily at bedtime      meclizine (ANTIVERT) 25 mg tablet Take 1 tablet (25 mg total) by mouth every 8 (eight) hours as needed for dizziness 30 tablet 0    metoprolol tartrate (LOPRESSOR) 25 mg tablet TAKE 2 TABLETS IN THE MORNING AND 1 TABLET IN THE EVENING 270 tablet 1    metroNIDAZOLE (METROGEL) 1 % gel       Multiple Vitamins-Minerals (CENTRUM ADULTS PO) Take 1 tablet by mouth daily      Multiple Vitamins-Minerals (PRESERVISION AREDS) CAPS Take 1 tablet by mouth 2 (two) times a day      spironolactone " (ALDACTONE) 25 mg tablet Take 25 mg by mouth daily       No current facility-administered medications on file prior to visit.      Social History     Tobacco Use    Smoking status: Never    Smokeless tobacco: Never    Tobacco comments:     [employer] also states unknown if ever smoked   Vaping Use    Vaping status: Never Used   Substance and Sexual Activity    Alcohol use: No    Drug use: Never    Sexual activity: Not Currently         Objective     There were no vitals taken for this visit.  Physical Exam    Visit Time  Total Visit Duration: 15

## 2024-10-23 NOTE — TELEPHONE ENCOUNTER
Pt called via access center pt stated she just received a phone called in regards to her ultrasound the only phone call I see is from maribel April eleanor on 10-9-24 I advised access that I will check with dr womack to be sure she didn't not call the pt

## 2024-10-25 ENCOUNTER — TELEPHONE (OUTPATIENT)
Age: 84
End: 2024-10-25

## 2024-10-31 DIAGNOSIS — I10 ESSENTIAL HYPERTENSION: ICD-10-CM

## 2024-10-31 RX ORDER — METOPROLOL TARTRATE 25 MG/1
TABLET, FILM COATED ORAL
Qty: 270 TABLET | Refills: 1 | Status: SHIPPED | OUTPATIENT
Start: 2024-10-31

## 2024-12-04 ENCOUNTER — RESULTS FOLLOW-UP (OUTPATIENT)
Age: 84
End: 2024-12-04

## 2024-12-23 ENCOUNTER — RA CDI HCC (OUTPATIENT)
Dept: OTHER | Facility: HOSPITAL | Age: 84
End: 2024-12-23

## 2024-12-30 ENCOUNTER — OFFICE VISIT (OUTPATIENT)
Age: 84
End: 2024-12-30
Payer: MEDICARE

## 2024-12-30 VITALS
OXYGEN SATURATION: 98 % | HEART RATE: 55 BPM | TEMPERATURE: 98.2 F | RESPIRATION RATE: 16 BRPM | HEIGHT: 62 IN | WEIGHT: 164.4 LBS | DIASTOLIC BLOOD PRESSURE: 60 MMHG | SYSTOLIC BLOOD PRESSURE: 130 MMHG | BODY MASS INDEX: 30.25 KG/M2

## 2024-12-30 DIAGNOSIS — Z87.74 HISTORY OF BICUSPID AORTIC VALVE: ICD-10-CM

## 2024-12-30 DIAGNOSIS — I10 ESSENTIAL HYPERTENSION: Primary | ICD-10-CM

## 2024-12-30 DIAGNOSIS — Z95.2 S/P AVR (AORTIC VALVE REPLACEMENT): ICD-10-CM

## 2024-12-30 DIAGNOSIS — E78.5 HYPERLIPIDEMIA, UNSPECIFIED HYPERLIPIDEMIA TYPE: ICD-10-CM

## 2024-12-30 DIAGNOSIS — Z95.1 S/P CABG X 1: ICD-10-CM

## 2024-12-30 PROBLEM — I77.9 BILATERAL CAROTID ARTERY DISEASE, UNSPECIFIED TYPE (HCC): Status: ACTIVE | Noted: 2024-12-30

## 2024-12-30 PROBLEM — I77.9 BILATERAL CAROTID ARTERY DISEASE, UNSPECIFIED TYPE (HCC): Status: RESOLVED | Noted: 2024-12-30 | Resolved: 2024-12-30

## 2024-12-30 PROCEDURE — G2211 COMPLEX E/M VISIT ADD ON: HCPCS | Performed by: INTERNAL MEDICINE

## 2024-12-30 PROCEDURE — 99214 OFFICE O/P EST MOD 30 MIN: CPT | Performed by: INTERNAL MEDICINE

## 2024-12-30 RX ORDER — SPIRONOLACTONE 25 MG/1
25 TABLET ORAL DAILY
COMMUNITY
Start: 2024-11-04

## 2024-12-30 RX ORDER — HYDROCHLOROTHIAZIDE 25 MG/1
25 TABLET ORAL DAILY
COMMUNITY
Start: 2024-10-13

## 2024-12-30 NOTE — PROGRESS NOTES
"Name: Adwoa Ayoub      : 1940      MRN: [id number]  Encounter Provider: Amber Murphy MD  Encounter Date: 2024   Encounter department: [facility]  :  Assessment & Plan  Essential hypertension  Well-controlled continue current regimen  Orders:    Comprehensive metabolic panel    History of bicuspid aortic valve  Status post TAVR doing well       S/P AVR (aortic valve replacement)         Hyperlipidemia, unspecified hyperlipidemia type  Tolerating statins LDL at target continue current regimen       S/P CABG x 1                History of Present Illness     Hypertension       Hypertension hyperlipidemia I suspect aortic valve status post replacement 8 years hyperlipidemia status post CABG is here to follow-up on chronic medical problems and recent CT neck.  #1 hypertension patient is on amlodipine HCTZ Avapro metoprololand Aldactone.  Will continue current regimen lab studies before next visit patient recently had an appoint with cardiology no concerns found.  #2 hyperlipidemia LDL at target.  Patient is tolerating medication well.  #3 vertigo.  Carotid ultrasound was ordered that raise a concern of possible stenosis in the right carotid.  A CTA neck was carried out which did not show any stenosis.  Patient will continue her statins.    #4 patient has multiple thyroid nodules and is followed by endocrinology.   Review of Systems    Objective   /60 (BP Location: Left arm, Patient Position: Sitting, Cuff Size: Large)   Pulse 55   Temp 98.2 °F (36.8 °C) (Tympanic)   Resp 16   Ht 5' 2\" (1.575 m)   Wt 74.6 kg (164 lb 6.4 oz)   SpO2 98%   BMI 30.07 kg/m²      Physical Exam  Neck:      Comments: Right carotid transmitted sounds  Cardiovascular:      Rate and Rhythm: Normal rate and regular rhythm.      Heart sounds: Murmur heard.   Pulmonary:      Effort: Pulmonary effort is normal. No respiratory distress.      Breath sounds: Normal breath sounds. No wheezing or rales. "   Musculoskeletal:      Right lower leg: No edema.      Left lower leg: No edema.   Neurological:      Mental Status: She is alert.

## 2025-04-16 DIAGNOSIS — I10 HYPERTENSION, UNCONTROLLED: ICD-10-CM

## 2025-04-17 RX ORDER — AMLODIPINE BESYLATE 10 MG/1
10 TABLET ORAL
Qty: 90 TABLET | Refills: 1 | Status: SHIPPED | OUTPATIENT
Start: 2025-04-17

## 2025-04-22 LAB
ALBUMIN SERPL-MCNC: 4.1 G/DL (ref 3.5–5.7)
ALP SERPL-CCNC: 71 U/L (ref 35–120)
ALT SERPL-CCNC: 17 U/L
ANION GAP SERPL CALCULATED.3IONS-SCNC: 6 MMOL/L (ref 3–11)
AST SERPL-CCNC: 20 U/L
BILIRUB SERPL-MCNC: 0.5 MG/DL (ref 0.2–1)
BUN SERPL-MCNC: 33 MG/DL (ref 7–25)
CALCIUM SERPL-MCNC: 9 MG/DL (ref 8.5–10.5)
CHLORIDE SERPL-SCNC: 104 MMOL/L (ref 100–109)
CO2 SERPL-SCNC: 29 MMOL/L (ref 21–31)
CREAT SERPL-MCNC: 0.89 MG/DL (ref 0.4–1.1)
CYTOLOGY CMNT CVX/VAG CYTO-IMP: ABNORMAL
GFR/BSA.PRED SERPLBLD CYS-BASED-ARV: 63 ML/MIN/{1.73_M2}
GLUCOSE SERPL-MCNC: 104 MG/DL (ref 65–99)
POTASSIUM SERPL-SCNC: 4.6 MMOL/L (ref 3.5–5.2)
PROT SERPL-MCNC: 6.4 G/DL (ref 6.3–8.3)
SODIUM SERPL-SCNC: 139 MMOL/L (ref 135–145)

## 2025-04-29 ENCOUNTER — OFFICE VISIT (OUTPATIENT)
Age: 85
End: 2025-04-29
Payer: MEDICARE

## 2025-04-29 VITALS
OXYGEN SATURATION: 98 % | RESPIRATION RATE: 16 BRPM | DIASTOLIC BLOOD PRESSURE: 72 MMHG | HEIGHT: 61 IN | HEART RATE: 57 BPM | BODY MASS INDEX: 30.78 KG/M2 | TEMPERATURE: 97.6 F | SYSTOLIC BLOOD PRESSURE: 136 MMHG | WEIGHT: 163 LBS

## 2025-04-29 DIAGNOSIS — I50.42: ICD-10-CM

## 2025-04-29 DIAGNOSIS — I10 ESSENTIAL HYPERTENSION: ICD-10-CM

## 2025-04-29 DIAGNOSIS — E78.49 OTHER HYPERLIPIDEMIA: ICD-10-CM

## 2025-04-29 DIAGNOSIS — I10 HYPERTENSION, UNCONTROLLED: Primary | ICD-10-CM

## 2025-04-29 DIAGNOSIS — I38: ICD-10-CM

## 2025-04-29 DIAGNOSIS — R73.9 HYPERGLYCEMIA: ICD-10-CM

## 2025-04-29 PROCEDURE — 99214 OFFICE O/P EST MOD 30 MIN: CPT | Performed by: INTERNAL MEDICINE

## 2025-04-29 PROCEDURE — G2211 COMPLEX E/M VISIT ADD ON: HCPCS | Performed by: INTERNAL MEDICINE

## 2025-04-29 RX ORDER — METOPROLOL TARTRATE 25 MG/1
TABLET, FILM COATED ORAL
Qty: 270 TABLET | Refills: 1 | Status: SHIPPED | OUTPATIENT
Start: 2025-04-29

## 2025-04-29 NOTE — PROGRESS NOTES
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  Lab was all associated with electrolytes were good.  Patient is Aldactone and HCTZ and has been euvolemic.   She is nervous about her upcoming squamous and renal cell carcinoma surgery on her chest and face in the following weeks.  She will be getting her COVID vaccination before doing so.  Review of Systems    Objective   /72 (BP Location: Left arm, Patient Position: Sitting, Cuff Size: Large)   Pulse 57   Temp 97.6 °F (36.4 °C) (Temporal)   Resp 16   Ht 5' 1\" (1.549 m)   Wt 73.9 kg (163 lb)   SpO2 98%   BMI 30.80 kg/m²      Physical Exam  Cardiovascular:      Rate and Rhythm: Normal rate and regular rhythm.      Heart sounds: Murmur (Early systolic) heard.   Pulmonary:      Effort: Pulmonary effort is normal. No respiratory distress.      Breath sounds: No wheezing or rales.   Abdominal:      General: There is " no distension.      Tenderness: There is no abdominal tenderness. There is no guarding.   Musculoskeletal:      Right lower leg: No edema.      Left lower leg: No edema.   Neurological:      Mental Status: She is alert.

## 2025-04-29 NOTE — ASSESSMENT & PLAN NOTE
Tolerating medications continue current regimen lab studies ordered  Orders:    Lipid Panel with Direct LDL reflex; Future

## 2025-04-29 NOTE — ASSESSMENT & PLAN NOTE
Wt Readings from Last 3 Encounters:   04/29/25 73.9 kg (163 lb)   03/04/25 73.9 kg (163 lb)   12/30/24 74.6 kg (164 lb 6.4 oz)     Euvolemic continue current regimen

## 2025-07-22 LAB
ALBUMIN SERPL-MCNC: 4 G/DL (ref 3.5–5.7)
ALP SERPL-CCNC: 86 U/L (ref 35–120)
ALT SERPL-CCNC: 11 U/L
ANION GAP SERPL CALCULATED.3IONS-SCNC: 12 MMOL/L (ref 3–11)
AST SERPL-CCNC: 15 U/L
BASOPHILS # BLD AUTO: 0.1 THOU/CMM (ref 0–0.1)
BASOPHILS NFR BLD AUTO: 1 %
BILIRUB SERPL-MCNC: 0.5 MG/DL (ref 0.2–1)
BUN SERPL-MCNC: 36 MG/DL (ref 7–25)
CALCIUM SERPL-MCNC: 9.5 MG/DL (ref 8.5–10.5)
CHLORIDE SERPL-SCNC: 105 MMOL/L (ref 100–109)
CHOLEST SERPL-MCNC: 126 MG/DL
CHOLEST/HDLC SERPL: 2.7 {RATIO}
CO2 SERPL-SCNC: 24 MMOL/L (ref 21–31)
CREAT SERPL-MCNC: 0.88 MG/DL (ref 0.4–1.1)
CYTOLOGY CMNT CVX/VAG CYTO-IMP: ABNORMAL
DIFFERENTIAL METHOD BLD: ABNORMAL
EOSINOPHIL # BLD AUTO: 0.3 THOU/CMM (ref 0–0.5)
EOSINOPHIL NFR BLD AUTO: 5 %
ERYTHROCYTE [DISTWIDTH] IN BLOOD BY AUTOMATED COUNT: 13.9 % (ref 12–16)
EST. AVERAGE GLUCOSE BLD GHB EST-MCNC: 134 MG/DL
GFR/BSA.PRED SERPLBLD CYS-BASED-ARV: 64 ML/MIN/{1.73_M2}
GLUCOSE SERPL-MCNC: 96 MG/DL (ref 65–99)
HBA1C MFR BLD: 6.3 %
HCT VFR BLD AUTO: 34.2 % (ref 35–43)
HDLC SERPL-MCNC: 46 MG/DL (ref 23–92)
HGB BLD-MCNC: 11.5 G/DL (ref 11.5–14.5)
LDLC SERPL CALC-MCNC: 49 MG/DL
LYMPHOCYTES # BLD AUTO: 1.1 THOU/CMM (ref 1–3)
LYMPHOCYTES NFR BLD AUTO: 17 %
MCH RBC QN AUTO: 29.2 PG (ref 26–34)
MCHC RBC AUTO-ENTMCNC: 33.5 G/DL (ref 32–37)
MCV RBC AUTO: 87 FL (ref 80–100)
MONOCYTES # BLD AUTO: 0.9 THOU/CMM (ref 0.3–1)
MONOCYTES NFR BLD AUTO: 14 %
NEUTROPHILS # BLD AUTO: 3.9 THOU/CMM (ref 1.8–7.8)
NEUTROPHILS NFR BLD AUTO: 63 %
NONHDLC SERPL-MCNC: 80 MG/DL
PLATELET # BLD AUTO: 201 THOU/CMM (ref 140–350)
PMV BLD REES-ECKER: 9 FL (ref 7.5–11.3)
POTASSIUM SERPL-SCNC: 4.2 MMOL/L (ref 3.5–5.2)
PROT SERPL-MCNC: 6.8 G/DL (ref 6.3–8.3)
RBC # BLD AUTO: 3.92 MILL/CMM (ref 3.7–4.7)
SODIUM SERPL-SCNC: 141 MMOL/L (ref 135–145)
TRIGL SERPL-MCNC: 155 MG/DL
WBC # BLD AUTO: 6.1 THOU/CMM (ref 4–10)

## 2025-07-29 ENCOUNTER — OFFICE VISIT (OUTPATIENT)
Age: 85
End: 2025-07-29
Payer: MEDICARE

## 2025-07-29 VITALS
SYSTOLIC BLOOD PRESSURE: 126 MMHG | HEART RATE: 66 BPM | BODY MASS INDEX: 30.29 KG/M2 | OXYGEN SATURATION: 100 % | HEIGHT: 61 IN | DIASTOLIC BLOOD PRESSURE: 86 MMHG | WEIGHT: 160.4 LBS | TEMPERATURE: 97.6 F | RESPIRATION RATE: 16 BRPM

## 2025-07-29 DIAGNOSIS — Z95.2 S/P AVR (AORTIC VALVE REPLACEMENT): ICD-10-CM

## 2025-07-29 DIAGNOSIS — E78.49 OTHER HYPERLIPIDEMIA: ICD-10-CM

## 2025-07-29 DIAGNOSIS — I10 ESSENTIAL HYPERTENSION: Primary | ICD-10-CM

## 2025-07-29 DIAGNOSIS — R73.09 ELEVATED HEMOGLOBIN A1C: ICD-10-CM

## 2025-07-29 DIAGNOSIS — Z95.1 S/P CABG X 1: ICD-10-CM

## 2025-07-29 PROCEDURE — G2211 COMPLEX E/M VISIT ADD ON: HCPCS | Performed by: INTERNAL MEDICINE

## 2025-07-29 PROCEDURE — 99214 OFFICE O/P EST MOD 30 MIN: CPT | Performed by: INTERNAL MEDICINE
